# Patient Record
Sex: FEMALE | Race: WHITE | Employment: UNEMPLOYED | URBAN - METROPOLITAN AREA
[De-identification: names, ages, dates, MRNs, and addresses within clinical notes are randomized per-mention and may not be internally consistent; named-entity substitution may affect disease eponyms.]

---

## 2020-05-21 ENCOUNTER — PREP FOR PROCEDURE (OUTPATIENT)
Dept: PAIN MANAGEMENT | Age: 50
End: 2020-05-21

## 2020-05-21 ENCOUNTER — OFFICE VISIT (OUTPATIENT)
Dept: PAIN MANAGEMENT | Age: 50
End: 2020-05-21
Payer: COMMERCIAL

## 2020-05-21 VITALS
SYSTOLIC BLOOD PRESSURE: 136 MMHG | HEART RATE: 84 BPM | DIASTOLIC BLOOD PRESSURE: 82 MMHG | TEMPERATURE: 98.2 F | RESPIRATION RATE: 16 BRPM

## 2020-05-21 PROBLEM — M79.604 PAIN IN RIGHT LEG: Status: ACTIVE | Noted: 2020-05-21

## 2020-05-21 PROBLEM — M47.816 LUMBAR FACET ARTHROPATHY: Status: ACTIVE | Noted: 2020-05-21

## 2020-05-21 PROBLEM — M54.16 LUMBAR RADICULOPATHY: Status: ACTIVE | Noted: 2020-05-21

## 2020-05-21 PROBLEM — G89.4 CHRONIC PAIN SYNDROME: Status: ACTIVE | Noted: 2020-05-21

## 2020-05-21 PROBLEM — M47.816 LUMBAR SPONDYLOSIS: Status: ACTIVE | Noted: 2020-05-21

## 2020-05-21 PROBLEM — M51.9 LUMBAR DISC DISORDER: Status: ACTIVE | Noted: 2020-05-21

## 2020-05-21 PROCEDURE — G8427 DOCREV CUR MEDS BY ELIG CLIN: HCPCS | Performed by: PAIN MEDICINE

## 2020-05-21 PROCEDURE — G8421 BMI NOT CALCULATED: HCPCS | Performed by: PAIN MEDICINE

## 2020-05-21 PROCEDURE — 99204 OFFICE O/P NEW MOD 45 MIN: CPT | Performed by: PAIN MEDICINE

## 2020-05-21 PROCEDURE — 99204 OFFICE O/P NEW MOD 45 MIN: CPT

## 2020-05-21 PROCEDURE — 1036F TOBACCO NON-USER: CPT | Performed by: PAIN MEDICINE

## 2020-05-21 RX ORDER — ACETAMINOPHEN 325 MG/1
650 TABLET ORAL EVERY 6 HOURS PRN
COMMUNITY

## 2020-05-21 NOTE — PATIENT INSTRUCTIONS
.    Pain Management Department      Epidural Spinal Steroid Injection:  Epidural steroid injections can be an effective treatment for nerve root pain. Pinched or irritated nerves that exit the spine can cause shooting pain, burning, tingling, numbness, and muscle weakness. This is often caused by a bulging disc, herniated disc (slipped disc), trauma or aging of the spine. Epidural injections can help by placing a steroid medication which is an anti- inflammatory medicine around the irritated nerves to reduce inflammation. What are the different types of epidural spinal injections;  Caudal epidural steroid injections, if symptoms are in the low back and lower extremities. Mostly done for patients who had low back surgery. Lumbar lnterlaminar epidural injections, if symptoms are in the lower back and lower extremities. Thoracic lnterlaminar epidural injections, if symptoms are in the mid back and rib cage. Cervical lnterlaminar epidural injections, if symptoms are in the neck and upper extremities. Pre-procedure Instructions:  1- Your current medications will be reviewed and you will be instructed on which medications to discontinue before the procedure. 2- lf sedation is administered you will be required to fast before the procedure (eight hours) 3- A  will be required if sedation is administered. Procedure:  A local anesthetic will be used to numb your skin. A needle will be advanced under X-ray to the target area. Placement will be confirmed by dye injection after which medicine will be placed, then the needle will be removed and Band-Aid will be applied. Post-procedure: You will be monitored in the recovery room for up to 30 minutes after the injection, when you are ready to leave, the staff will give you the discharge instructions. The extent and duration of pain relief may depend on the amount of disc or nerve root irritation. Other coexisting factors may contribute to your pain. Sometimes an injection brings several weeks to months of pain relief  and then further treatment is needed.     Pain Management Department      Epidural Spinal Steroid Injection:  Epidural steroid injections can be an effective treatment for nerve root pain. Pinched or irritated nerves that exit the spine can cause shooting pain, burning, tingling, numbness, and muscle weakness. This is often caused by a bulging disc, herniated disc (slipped disc), trauma or aging of the spine. Epidural injections can help by placing a steroid medication which is an anti- inflammatory medicine around the irritated nerves to reduce inflammation. What are the different types of epidural spinal injections;  Caudal epidural steroid injections, if symptoms are in the low back and lower extremities. Mostly done for patients who had low back surgery. Lumbar lnterlaminar epidural injections, if symptoms are in the lower back and lower extremities. Thoracic lnterlaminar epidural injections, if symptoms are in the mid back and rib cage. Cervical lnterlaminar epidural injections, if symptoms are in the neck and upper extremities. Pre-procedure Instructions:  3- Your current medications will be reviewed and you will be instructed on which medications to discontinue before the procedure. 4- lf sedation is administered you will be required to fast before the procedure (eight hours) 3- A  will be required if sedation is administered. Procedure:  A local anesthetic will be used to numb your skin. A needle will be advanced under X-ray to the target area. Placement will be confirmed by dye injection after which medicine will be placed, then the needle will be removed and Band-Aid will be applied. Post-procedure: You will be monitored in the recovery room for up to 30 minutes after the injection, when you are ready to leave, the staff will give you the discharge instructions.  The extent and duration of pain relief may

## 2020-05-21 NOTE — PROGRESS NOTES
Do you currently have any of the following:    Fever: No  Headache:  No  Cough: No  Shortness of breath: No  Exposed to anyone with these symptoms: No                                                                                                                Karyna Witt presents to the [address] [address] on 5/21/2020. Olimpia Valencia is complaining of pain in her mid and lower back and down the right leg. The pain is constant. The pain is described as sharp and burning. Pain is rated on her best day at a 5, on her worst day at a 10, and on average at a 5 on the VAS scale. She took her last dose of Tylenol 2 weeks ago. Olimpia Valencia does not have issues with constipation. She is not on NSAIDS and  is not on anticoagulation medications. Pacemaker or defibrilator: No Physician managing device is N/A.       /82   Pulse 84   Temp 98.2 °F (36.8 °C)   Resp 16      No LMP recorded. Patient has had a hysterectomy.
and interventional procedures. Patient had tried physical therapy with moderate relief. Will schedule patient for right L2 and L4 transforaminal epidural steroid injection and assess her response. R/B/A discussed in details. Will consider low dose Neurontin if her radicular symptoms doesn't improve. Will consider EMG of right lower extremity if her symptoms are not improving. Continue with OTC pain medications. Cancel urine screen, no opioids started  OARRS report reviewed 05/2020. Patient encouraged to stay active and to lose weight. Treatment plan discussed with the patient including medications and procedure side effects. We discussed with the patient that combining opioids, benzodiazepines, alcohol, illicit drugs or sleep aids increases the risk of respiratory depression including death. We discussed that these medications may cause drowsiness, sedation or dizziness and have counseled the patient not to drive or operate machinery. We have discussed that these medications will be prescribed only by one provider. We have discussed with the patient about age related risk factors and have thoroughly discussed the importance of taking these medications as prescribed. The patient verbalizes understanding. ccrefquaning arpita Aguilar M.D.

## 2020-06-18 ENCOUNTER — TELEPHONE (OUTPATIENT)
Dept: PAIN MANAGEMENT | Age: 50
End: 2020-06-18

## 2020-06-18 NOTE — TELEPHONE ENCOUNTER
6-18-20-Dari is scheduled for her procedure on 6-29-20. Spoke to her, she states she did Physical therapy from November 2019-February 2020. She now does her home exercises that she learned from [address] [place] [address]. She has increasing pain in her back and is having a hard time walking, walking up the stairs and has a hard time doing household chores. She rates her pain level between 7-9. Will do insurance authorization with [facility] to see if the procedure will be authorized.     Fabiola Canchola RN  Pain Management

## 2020-06-22 ENCOUNTER — HOSPITAL ENCOUNTER (OUTPATIENT)
Age: 50
Discharge: HOME | End: 2020-06-24
Payer: COMMERCIAL

## 2020-06-22 PROCEDURE — U0003 INFECTIOUS AGENT DETECTION BY NUCLEIC ACID (DNA OR RNA); SEVERE ACUTE RESPIRATORY SYNDROME CORONAVIRUS 2 (SARS-COV-2) (CORONAVIRUS DISEASE [COVID-19]), AMPLIFIED PROBE TECHNIQUE, MAKING USE OF HIGH THROUGHPUT TECHNOLOGIES AS DESCRIBED BY CMS-2020-01-R: HCPCS

## 2020-06-24 LAB
SARS-COV-2: NOT DETECTED
SOURCE: NORMAL

## 2020-06-29 ENCOUNTER — HOSPITAL ENCOUNTER (OUTPATIENT)
Age: 50
Setting detail: OUTPATIENT SURGERY
Discharge: HOME | End: 2020-06-29
Attending: PAIN MEDICINE | Admitting: PAIN MEDICINE
Payer: COMMERCIAL

## 2020-06-29 ENCOUNTER — HOSPITAL ENCOUNTER (OUTPATIENT)
Dept: OPERATING ROOM | Age: 50
Setting detail: OUTPATIENT SURGERY
Discharge: HOME | End: 2020-06-29
Attending: PAIN MEDICINE
Payer: COMMERCIAL

## 2020-06-29 VITALS
RESPIRATION RATE: 12 BRPM | SYSTOLIC BLOOD PRESSURE: 135 MMHG | OXYGEN SATURATION: 98 % | DIASTOLIC BLOOD PRESSURE: 70 MMHG | TEMPERATURE: 98.2 F | HEART RATE: 74 BPM

## 2020-06-29 PROCEDURE — 7100000010 HC PHASE II RECOVERY - FIRST 15 MIN: Performed by: PAIN MEDICINE

## 2020-06-29 PROCEDURE — 6360000002 HC RX W HCPCS: Performed by: PAIN MEDICINE

## 2020-06-29 PROCEDURE — 3209999900 FLUORO FOR SURGICAL PROCEDURES

## 2020-06-29 PROCEDURE — 3600000005 HC SURGERY LEVEL 5 BASE: Performed by: PAIN MEDICINE

## 2020-06-29 PROCEDURE — 3600000015 HC SURGERY LEVEL 5 ADDTL 15MIN: Performed by: PAIN MEDICINE

## 2020-06-29 PROCEDURE — 2500000003 HC RX 250 WO HCPCS: Performed by: PAIN MEDICINE

## 2020-06-29 PROCEDURE — 64483 NJX AA&/STRD TFRM EPI L/S 1: CPT | Performed by: PAIN MEDICINE

## 2020-06-29 PROCEDURE — 7100000011 HC PHASE II RECOVERY - ADDTL 15 MIN: Performed by: PAIN MEDICINE

## 2020-06-29 PROCEDURE — 6360000004 HC RX CONTRAST MEDICATION: Performed by: PAIN MEDICINE

## 2020-06-29 PROCEDURE — 64484 NJX AA&/STRD TFRM EPI L/S EA: CPT | Performed by: PAIN MEDICINE

## 2020-06-29 PROCEDURE — 2709999900 HC NON-CHARGEABLE SUPPLY: Performed by: PAIN MEDICINE

## 2020-06-29 RX ORDER — LIDOCAINE HYDROCHLORIDE 5 MG/ML
INJECTION, SOLUTION INFILTRATION; INTRAVENOUS PRN
Status: DISCONTINUED | OUTPATIENT
Start: 2020-06-29 | End: 2020-06-29 | Stop reason: ALTCHOICE

## 2020-06-29 ASSESSMENT — PAIN SCALES - GENERAL
PAINLEVEL_OUTOF10: 3
PAINLEVEL_OUTOF10: 0

## 2020-06-29 ASSESSMENT — PAIN - FUNCTIONAL ASSESSMENT
PAIN_FUNCTIONAL_ASSESSMENT: PREVENTS OR INTERFERES SOME ACTIVE ACTIVITIES AND ADLS
PAIN_FUNCTIONAL_ASSESSMENT: 0-10

## 2020-06-29 ASSESSMENT — PAIN DESCRIPTION - DESCRIPTORS: DESCRIPTORS: ACHING;BURNING

## 2020-06-29 NOTE — OP NOTE
2020    Patient: Felton Skiff  :  1970  Age:  53 y.o. Sex:  female     PRE-OPERATIVE DIAGNOSIS: Lumbar disc displacement, lumbar neural foraminal stenosis, lumbar radiculopathy. POST-OPERATIVE DIAGNOSIS: Same. PROCEDURE: Right Transforaminal epidural steroid injection under fluoroscopic guidance at foraminal level L2 and L4 (#1). SURGEON: MINDY Ford M.D. ANESTHESIA: Local    ESTIMATED BLOOD LOSS: None.  ______________________________________________________________________  BRIEF HISTORY: Felton Skiff comes in today for the first Right transforaminal epidural steroid injection under fluoroscopic guidance at foraminal level L2 and L4 . The potential complications of this procedure were discussed with her again today. She has elected to undergo the aforementioned procedure. Stella complete History & Physical examination were reviewed in depth, a copy of which is in the chart. DESCRIPTION OF PROCEDURE:    After confirming written and informed consent, a time-out was performed and Stelal name and date of birth, the procedure to be performed as well as the plan for the location of the needle insertion were confirmed. The patient was brought into the procedure room and placed in the prone position on the fluoroscopy table. Standard monitors were placed and vital signs were observed throughout the procedure. The area of the lumbar spine was prepped with chloraprep and draped in a sterile manner. The vertebral body was identified with AP fluoroscopy. An oblique view was obtained to better visualize the inferior junction of the pedicle and transverse process . The 6 o'clock position of the pedicle was marked and identified. The skin and subcutaneous tissue were anesthetized with 0.5% lidocaine. A # 22 gauge pencil point needle was directed toward the targeted point under fluoroscopy until bone was contacted.  The needle was then walked inferiorly until the neural foramen was entered . A lateral fluoroscopic view was then used to place the needle tip in the middle of the foramen. Negative aspiration was confirmed for blood and CSF and 0.5 cc of Omnipaque 240 contrast was injected at each level under live fluoroscopy. Appropriate neurograms were observed under AP fluoroscopy. Then after negative aspiration, a solution of the 2 cc of 0.5% lidocaine and 40 mg DepoMedrol was easily injected at each level. The needles were removed with constant aspiration technique. The patient back was cleaned and a bandage was placed over the needle insertion points    Disposition the patient tolerated the procedure well and there were no complications . Vital signs remained stable throughout the procedure. The patient was escorted to the recovery area where they remained until discharge and written discharge instructions for the procedure were given. Plan: Hussein Lloyd will return to our pain management center as scheduled.      Lalit Correia MD

## 2020-06-29 NOTE — H&P
Stress: Not on file   Relationships    Social connections     Talks on phone: Not on file     Gets together: Not on file     Attends Qu[employer] service: Not on file     Active member of club or organization: Not on file     Attends meetings of clubs or organizations: Not on file     Relationship status: Not on file    Intimate partner violence     Fear of current or ex partner: Not on file     Emotionally abused: Not on file     Physically abused: Not on file     Forced sexual activity: Not on file   Other Topics Concern    Not on file   Social History Narrative    Not on file       Family History   Problem Relation Age of Onset    Heart Failure Mother     Cancer Brother     Arthritis Brother     Coronary Art Dis Brother     Diabetes Sister     Hypertension Sister     Arthritis Sister          REVIEW OF SYSTEMS:    CONSTITUTIONAL:  negative for  fevers, chills, sweats and fatigue    RESPIRATORY:  negative for  dry cough, cough with sputum, dyspnea, wheezing and chest pain    CARDIOVASCULAR:  negative for chest pain, dyspnea, palpitations, syncope    GASTROINTESTINAL:  negative for nausea, vomiting, change in bowel habits, diarrhea, constipation and abdominal pain    MUSCULOSKELETAL: negative for muscle weakness    SKIN: negative for itching or rashes. BEHAVIOR/PSYCH:  negative for poor appetite, increased appetite, decreased sleep and poor concentration    All other systems negative      PHYSICAL EXAM:    VITALS:  /64   Pulse 80   Temp 98.2 °F (36.8 °C) (Temporal)   Resp 20   SpO2 97%     CONSTITUTIONAL:  awake, alert, cooperative, no apparent distress, and appears stated age    EYES: PERRLA, EOMI    LUNGS:  No increased work of breathing, no audible wheezing    CARDIOVASCULAR:  regular rate and rhythm    ABDOMEN:  Soft non tender non distended     EXTREMITIES: no signs of clubbing or cyanosis. MUSCULOSKELETAL: negative for flaccid muscle tone or spastic movements.     SKIN: gross examination reveals no signs of rashes, or diaphoresis. NEURO: Cranial nerves II-XII grossly intact. No signs of agitated mood.        Assessment/Plan:    Low back and right  pain for right L2 an L4 TFESI

## 2020-08-04 ENCOUNTER — OFFICE VISIT (OUTPATIENT)
Dept: PAIN MANAGEMENT | Age: 50
End: 2020-08-04
Payer: COMMERCIAL

## 2020-08-04 VITALS
HEART RATE: 91 BPM | SYSTOLIC BLOOD PRESSURE: 122 MMHG | WEIGHT: 235 LBS | OXYGEN SATURATION: 98 % | DIASTOLIC BLOOD PRESSURE: 80 MMHG | HEIGHT: 66 IN | TEMPERATURE: 98 F | RESPIRATION RATE: 16 BRPM | BODY MASS INDEX: 37.77 KG/M2

## 2020-08-04 PROCEDURE — G8427 DOCREV CUR MEDS BY ELIG CLIN: HCPCS | Performed by: PAIN MEDICINE

## 2020-08-04 PROCEDURE — 1036F TOBACCO NON-USER: CPT | Performed by: PAIN MEDICINE

## 2020-08-04 PROCEDURE — G8417 CALC BMI ABV UP PARAM F/U: HCPCS | Performed by: PAIN MEDICINE

## 2020-08-04 PROCEDURE — 99213 OFFICE O/P EST LOW 20 MIN: CPT | Performed by: PAIN MEDICINE

## 2020-08-04 NOTE — PROGRESS NOTES
[address] [facility], [address] [place] [address] Felice  [phone]    Follow up Note      eNy Ray     Date of Visit:  8/4/2020    CC:  Patient presents for follow up   Chief Complaint   Patient presents with    Follow Up After Procedure      Right Transforaminal epidural steroid injection under fluoroscopic guidance at foraminal level L2 and L4 (#1). HPI:    Pain is unchanged. Appropriate analgesia with current medications regimen: not applicable. Change in quality of symptoms:no. Medication side effects:not applicable . Recent diagnostic testing:none. Recent interventional procedures:Right L2 and L4 TFESI .poor. She has not been on anticoagulation medications to include none. The patient  has not been on herbal supplements. The patient is not diabetic. Imaging:   Lumbar spine MRI 02/2020  Degenerative changes of the lumbar spine as discussed level by level in   the body of the report.  No significant change since prior examination.    Findings are most pronounced at L4-5 with mild to moderate left and mild   right neural foraminal stenosis, unchanged.  Right far lateral disc   extrusion at L2-3 abutting exiting nerve roots, unchanged.  No   significant spinal canal narrowing.     Previous treatments: medications. .         Potential Aberrant Drug-Related Behavior:   None     Urine Drug Screening:  None     OARRS report:  08/2020 consistent    Past Medical History:   Diagnosis Date    Arthritis     Cancer ([facility] [address]     liver per  2012, partial removal [facility]    Fibromyalgia     Fibromyalgia     Headache     migraines    Seizure ([facility] [address]     noneptilectic     Past Surgical History:   Procedure Laterality Date    ANESTHESIA NERVE BLOCK Right 6/29/2020    RIGHT L2 AND L4 TRANSFORAMINAL EPIDURAL STEROID INJECTION (CPT 20029,38157) performed by Franca Brannon MD at [facility] HYSTERECTOMY      LIVER RESECTION      NERVE BLOCK Right 06/29/2020    transforaminal L2 and L4     Prior to Admission medications    Medication Sig Start Date End Date Taking? Authorizing Provider   acetaminophen (TYLENOL) 325 MG tablet Take 650 mg by mouth every 6 hours as needed for Pain   Yes Historical Provider, MD     Allergies   Allergen Reactions    Aspirin Shortness Of Breath     States lip swelling, trouble breathing      Codeine Shortness Of Breath     States also passes out.     Synthroid [Levothyroxine Sodium] Swelling     Social History     Socioeconomic History    Marital status:      Spouse name: Not on file    Number of children: Not on file    Years of education: Not on file    Highest education level: Not on file   Occupational History    Not on file   Social Needs    Financial resource strain: Not on file    Food insecurity     Worry: Not on file     Inability: Not on file    Transportation needs     Medical: Not on file     Non-medical: Not on file   Tobacco Use    Smoking status: Never Smoker    Smokeless tobacco: Never Used   Substance and Sexual Activity    Alcohol use: No    Drug use: No    Sexual activity: Yes     Partners: Male   Lifestyle    Physical activity     Days per week: Not on file     Minutes per session: Not on file    Stress: Not on file   Relationships    Social connections     Talks on phone: Not on file     Gets together: Not on file     Attends Jehovah's witness service: Not on file     Active member of club or organization: Not on file     Attends meetings of clubs or organizations: Not on file     Relationship status: Not on file    Intimate partner violence     Fear of current or ex partner: Not on file     Emotionally abused: Not on file     Physically abused: Not on file     Forced sexual activity: Not on file   Other Topics Concern    Not on file   Social History Narrative    Not on file     Family History   Problem Relation Age of Onset    Heart Failure Mother     Cancer Brother     Arthritis Brother     Coronary Art Dis Brother     Diabetes Sister     Hypertension Sister     Arthritis Sister      REVIEW OF SYSTEMS:     [facility] denies fever/chills, chest pain, shortness of breath, new bowel or bladder complaints. All other review of systems was negative. PHYSICAL EXAMINATION:      /80   Pulse 91   Temp 98 °F (36.7 °C) (Infrared)   Resp 16   Ht 5' 6\" (1.676 m)   Wt 235 lb (106.6 kg)   SpO2 98%   BMI 37.93 kg/m²     General:       General appearance:   frail, elderly, pleasant and well-hydrated. , in moderate discomfort and A & O x3  Build: Morbidly obese     HEENT:     Head:normocephalic and atraumatic  Pupils:regular, round and equal.  Sclera: icterus present,      Lungs:     Breathing:Breathing Pattern: regular, no distress     Abdomen:     Shape:non-distended and normal  Tenderness:none     Lumbar spine:     Spine inspection:normal, exaggerated kyphosis, scoliosis, lordosis and surgical incision scar   CVA tenderness:No   Palpationright facet tenderness   Range of motion:abnormal moderately Lateral bending, flexion, extension rotation right and is  painful.     Musculoskeletal:     Trigger points in Paraveteral:absent bilaterally  SI joint tenderness:negative right, negative left. SLR:negative right, negative left, sitting      Extremities:     Tremors:None bilaterally upper and lower  Range of motion, pain with internal rotation of hips negative. Intact:Yes  Edema:Normal     Neurological:     Sensory:diminished to light touch lateral aspect of right leg.   Motor:                          Right Quadriceps5/5          Left Quadriceps5/5           Right Gastrocnemius5/5    Left Gastrocnemius5/5  Right Ant Tibialis5/5  Left Ant Tibialis5/5  Reflexes:    Right Quadriceps reflex2+  Left Quadriceps reflex2+  Right Achilles reflex2+  Left Achilles reflex2+  Gait:normal     Dermatology:     Skin:no unusual rashes and no skin lesions     Impression:  Chronic low back pain with radiation to the right lower extremity with numbness in the right leg. Lumbar spine MRI 2020 L2-3 right lateral disc extrusion and L4-5 disc bulge with facet hypertrophy and foraminal stenosis. Plan:  Follow up on her low back pain. Patient is s/p Right L2 and L4 TFESI with less than expected response. Will schedule patient for right lower extremity EMG. Continue with OTC pain medications. Cancel urine screen, no opioids started  OARRS report reviewed 05/2020. Patient encouraged to stay active and to lose weight. Failed physical therapy. Will refer patient for TENS unit. Treatment plan discussed with the patient including medications side effects. We discussed with the patient that combining opioids, benzodiazepines, alcohol, illicit drugs or sleep aids increases the risk of respiratory depression including death. We discussed that these medications may cause drowsiness, sedation or dizziness and have counseled the patient not to drive or operate machinery. We have discussed that these medications will be prescribed only by one provider. We have discussed with the patient about age related risk factors and have thoroughly discussed the importance of taking these medications as prescribed. The patient verbalizes understanding. ccreferring physic M. Arvell Hodgkin M.D.

## 2020-08-04 NOTE — PROGRESS NOTES
Do you currently have any of the following:    Fever: No  Headache:  No  Cough: No  Shortness of breath: No  Exposed to anyone with these symptoms: No                                                                                                                Marilyne Givens presents to the [address] on 8/4/2020. Nickie Traore is complaining of pain mid to lower back and right arm and leg. . The pain is constant. The pain is described as aching, throbbing, shooting, stabbing and sharp. Pain is rated on her best day at a 5, on her worst day at a 10, and on average at a 7 on the VAS scale. She took her last dose of Tylenol on Sunday. Lina Pisano does not have issues with constipation. Any procedures since your last visit: Yes, with 0 % relief. She is not on NSAIDS and  is not on anticoagulation medications to include none and is managed by NA. Pacemaker or defibrilator: No Physician managing device is NA.       /80   Pulse 91   Temp 98 °F (36.7 °C) (Infrared)   Resp 16   Ht 5' 6\" (1.676 m)   Wt 235 lb (106.6 kg)   SpO2 98%   BMI 37.93 kg/m²      No LMP recorded. Patient has had a hysterectomy.

## 2020-09-01 ENCOUNTER — OFFICE VISIT (OUTPATIENT)
Dept: PHYSICAL MEDICINE AND REHAB | Age: 50
End: 2020-09-01
Payer: COMMERCIAL

## 2020-09-01 VITALS
WEIGHT: 235 LBS | BODY MASS INDEX: 37.77 KG/M2 | HEIGHT: 66 IN | DIASTOLIC BLOOD PRESSURE: 80 MMHG | TEMPERATURE: 98 F | SYSTOLIC BLOOD PRESSURE: 132 MMHG

## 2020-09-01 PROCEDURE — 99201 PR OFFICE OUTPATIENT NEW 10 MINUTES: CPT | Performed by: PHYSICAL MEDICINE & REHABILITATION

## 2020-09-01 PROCEDURE — 95886 MUSC TEST DONE W/N TEST COMP: CPT | Performed by: PHYSICAL MEDICINE & REHABILITATION

## 2020-09-01 PROCEDURE — G8427 DOCREV CUR MEDS BY ELIG CLIN: HCPCS | Performed by: PHYSICAL MEDICINE & REHABILITATION

## 2020-09-01 PROCEDURE — G8417 CALC BMI ABV UP PARAM F/U: HCPCS | Performed by: PHYSICAL MEDICINE & REHABILITATION

## 2020-09-01 PROCEDURE — 95912 NRV CNDJ TEST 11-12 STUDIES: CPT | Performed by: PHYSICAL MEDICINE & REHABILITATION

## 2020-09-01 PROCEDURE — 1036F TOBACCO NON-USER: CPT | Performed by: PHYSICAL MEDICINE & REHABILITATION

## 2020-09-01 NOTE — PROGRESS NOTES
L Tibial - AH 48.0 4.9 43.1   L Peroneal - EDB 47.5 3.7 43.8         H Reflex      Nerve Lat Hmax    ms   R Tibial - Soleus 31.3   L Tibial - Soleus 32.7         EMG         EMG Summary Table     Spontaneous MUAP Recruitment   Muscle IA Fib PSW Fasc H.F. Amp Dur. PPP Pattern   R. Tibialis anterior Normal None None None None Normal Normal None Normal   R. Extensor hallucis longus Normal None None None None Normal Normal None Normal   R. Gastrocnemius (Medial head) Normal None None None None Normal Normal None Normal   R. Vastus medialis Normal None None None None Normal Normal None Normal   R. Vastus lateralis Normal None None None None Normal Normal None Normal   R. Adductor longus Normal None None None None Normal Normal None Normal   R. Lumbar paraspinals (mid) Normal None Few None None       R. Lumbar paraspinals (low) Normal None 1+ None None                  Summary of Findings:   Nerve conduction studies:   Sensory nerve conduction studies of the bilateral sural and superficial peroneal nerves showed normal distal latencies and normal SNAP amplitudes. Motor nerve conduction studies of the bilateral tibial and common peroneal nerves showed normal distal latencies, normal CMAP amplitudes, and normal conduction velocities. F-wave studies of the bilateral tibial and common peroneal nerves revealed normal latencies. Bilateral tibial nerve H-reflex responses were normal.     Needle EMG:   · Needle EMG was performed using a monopolar needle. · The following abnormalities were seen on needle EMG: Abnormal spontaneous activity was noted in the right lumbar paraspinals.  All other muscles tested, as listed in the table above, demonstrated normal amplitude, duration, phases, and recruitment, without evidence of active denervation. Diagnostic Interpretation:      This study was Abnormal.     1. There is electrodiagnostic evidence of right lumbar motor radiculopathy, as indicated by abnormal right lumbar paraspinal testing. This is not further defined, however, as all tested right lower extremity muscles are normal.     2. There is no electrodiagnostic evidence of any other peripheral nerve mononeuropathy, plexopathy, or peripheral polyneuropathy in the bilateral lower extremities. Cannot evaluate for left lumbar radiculopathy without completion of needle exam of the left lower extremity. Cannot evaluate for pure sensory radiculopathy or small fiber neuropathy by electrodiagnostic technique. Previous Study: No prior study available. Follow up EMG can be completed in the future if clinically indicated. Technologist: Kristen Moscoso    Physician:  Malik Moseley MD  Physical Medicine and Rehabilitation    Nerve conduction studies and electromyography were performed according to our laboratory policies and procedures which can be provided upon request. All abnormal values are identified in the table.  Laboratory normal values can also be provided upon request.       Cc: MD Thais Chow DO
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
## 2020-09-16 ENCOUNTER — OFFICE VISIT (OUTPATIENT)
Dept: PAIN MANAGEMENT | Age: 50
End: 2020-09-16
Payer: COMMERCIAL

## 2020-09-16 VITALS
TEMPERATURE: 97.1 F | HEART RATE: 76 BPM | HEIGHT: 66 IN | WEIGHT: 234 LBS | SYSTOLIC BLOOD PRESSURE: 142 MMHG | BODY MASS INDEX: 37.61 KG/M2 | RESPIRATION RATE: 16 BRPM | DIASTOLIC BLOOD PRESSURE: 80 MMHG

## 2020-09-16 PROBLEM — M54.6 PAIN IN THORACIC SPINE: Status: ACTIVE | Noted: 2020-09-16

## 2020-09-16 PROCEDURE — 1036F TOBACCO NON-USER: CPT | Performed by: PAIN MEDICINE

## 2020-09-16 PROCEDURE — 99214 OFFICE O/P EST MOD 30 MIN: CPT | Performed by: PAIN MEDICINE

## 2020-09-16 PROCEDURE — G8427 DOCREV CUR MEDS BY ELIG CLIN: HCPCS | Performed by: PAIN MEDICINE

## 2020-09-16 PROCEDURE — 99213 OFFICE O/P EST LOW 20 MIN: CPT | Performed by: PAIN MEDICINE

## 2020-09-16 PROCEDURE — G8417 CALC BMI ABV UP PARAM F/U: HCPCS | Performed by: PAIN MEDICINE

## 2020-09-16 RX ORDER — GABAPENTIN 100 MG/1
200 CAPSULE ORAL 2 TIMES DAILY
Qty: 120 CAPSULE | Refills: 0 | Status: SHIPPED
Start: 2020-09-16 | End: 2020-10-20

## 2020-09-16 NOTE — PROGRESS NOTES
Do you currently have any of the following:    Fever: No  Headache:  No  Cough: No  Shortness of breath: No  Exposed to anyone with these symptoms: No                                                                                                                Nnamdimarcos Thompson presents to the [facility] on 9/16/2020. Virgie Damico is complaining of pain in her low back. The pain is constant. The pain is described as colicky, stabbing and burning. Pain is rated on her best day at a 4, on her worst day at a 10, and on average at a 7 on the VAS scale. She took her last dose of Tylenol yesterday. Nona Sifuentes does not have issues with constipation. Any procedures since your last visit: No    She is not on NSAIDS and  is not on anticoagulation medications to include none. Pacemaker or defibrilator: No.       BP (!) 142/80   Pulse 76   Temp 97.1 °F (36.2 °C) (Temporal)   Resp 16   Ht 5' 6\" (1.676 m)   Wt 234 lb (106.1 kg)   BMI 37.77 kg/m²      No LMP recorded. Patient has had a hysterectomy.

## 2020-09-16 NOTE — PROGRESS NOTES
report:  09/2020 consistent    Past Medical History:   Diagnosis Date    Arthritis     Cancer [facility])     liver per  2012, partial removal [facility]    Fibromyalgia     Fibromyalgia     Headache     migraines    Low back pain     Seizure ([facility] [address]     noneptilectic     Past Surgical History:   Procedure Laterality Date    ANESTHESIA NERVE BLOCK Right 6/29/2020    RIGHT L2 AND L4 TRANSFORAMINAL EPIDURAL STEROID INJECTION (CPT 72823,33178) performed by Saumya Mcwilliams MD at [address] [facility] LIVER RESECTION      NERVE BLOCK Right 06/29/2020    transforaminal L2 and L4     Prior to Admission medications    Medication Sig Start Date End Date Taking? Authorizing Provider   acetaminophen (TYLENOL) 325 MG tablet Take 650 mg by mouth every 6 hours as needed for Pain   Yes Historical Provider, MD     Allergies   Allergen Reactions    Aspirin Shortness Of Breath     States lip swelling, trouble breathing      Codeine Shortness Of Breath     States also passes out.     Synthroid [Levothyroxine Sodium] Swelling     Social History     Socioeconomic History    Marital status:      Spouse name: Not on file    Number of children: Not on file    Years of education: Not on file    Highest education level: Not on file   Occupational History    Not on file   Social Needs    Financial resource strain: Not on file    Food insecurity     Worry: Not on file     Inability: Not on file    Transportation needs     Medical: Not on file     Non-medical: Not on file   Tobacco Use    Smoking status: Never Smoker    Smokeless tobacco: Never Used   Substance and Sexual Activity    Alcohol use: No    Drug use: No    Sexual activity: Yes     Partners: Male   Lifestyle    Physical activity     Days per week: Not on file     Minutes per session: Not on file    Stress: Not on file   Relationships    Social connections     Talks on phone: Not on file     Gets together: Not on file     Attends Orthodoxy service: Not on file     Active member of club or organization: Not on file     Attends meetings of clubs or organizations: Not on file     Relationship status: Not on file    Intimate partner violence     Fear of current or ex partner: Not on file     Emotionally abused: Not on file     Physically abused: Not on file     Forced sexual activity: Not on file   Other Topics Concern    Not on file   Social History Narrative    Not on file     Family History   Problem Relation Age of Onset    Heart Failure Mother     Cancer Brother     Arthritis Brother     Coronary Art Dis Brother     Diabetes Sister     Hypertension Sister     Arthritis Sister      REVIEW OF SYSTEMS:     Kirby Pizano denies fever/chills, chest pain, shortness of breath, new bowel or bladder complaints. All other review of systems was negative. PHYSICAL EXAMINATION:      BP (!) 142/80   Pulse 76   Temp 97.1 °F (36.2 °C) (Temporal)   Resp 16   Ht 5' 6\" (1.676 m)   Wt 234 lb (106.1 kg)   BMI 37.77 kg/m²     General:       General appearance:   frail, elderly, pleasant and well-hydrated. , in moderate discomfort and A & O x3  Build: Morbidly obese     HEENT:     Head:normocephalic and atraumatic  Sclera: icterus present,      Lungs:     Breathing:Breathing Pattern: regular, no distress     Abdomen:     Shape:non-distended and normal  Tenderness:none    Thoracic spine:    Spine inspection:normal   pationPal:tenderness paravertebral muscles, midline tenderness, facet loading, left, right and negative.   Range of motion:abnormal moderately flexion, extension rotation bilateral and is  painful.     Lumbar spine:     Spine inspection:normal, exaggerated kyphosis, scoliosis, lordosis and surgical incision scar   CVA tenderness:No   Palpationright facet tenderness   Range of motion:abnormal moderately Lateral bending, flexion, extension rotation right and is painful.     Musculoskeletal:     Trigger points in Paraveteral:absent bilaterally  SI joint tenderness:negative right, negative left. SLR:negative right, negative left, sitting      Extremities:     Tremors:None bilaterally upper and lower  Range of motion, pain with internal rotation of hips negative. Intact:Yes  Edema:Normal     Neurological:     Sensory:diminished to light touch lateral aspect of right leg. Motor:                          Right Quadriceps5/5          Left Quadriceps5/5           Right Gastrocnemius5/5    Left Gastrocnemius5/5  Right Ant Tibialis5/5  Left Ant Tibialis5/5  Reflexes:    Right Quadriceps reflex2+  Left Quadriceps reflex2+  Right Achilles reflex2+  Left Achilles reflex2+  Gait:normal     Dermatology:     Skin:no unusual rashes and no skin lesions     Impression:  Chronic low back pain with radiation to the right lower extremity with numbness in the right leg. Lumbar spine MRI 2020 L2-3 right lateral disc extrusion and L4-5 disc bulge with facet hypertrophy and foraminal stenosis. Plan:  Follow up on her low back pain with complaints of increased mid back pain. Results of right lower extremity EMG were discussed with the patient. Will schedule patient for thoracic spine Xray. Will start patient on Gabapentin 200 mg to be titrated to 400 mg as tolerated. Continue with OTC pain medications. OARRS report reviewed 09/2020. Patient encouraged to stay active and to lose weight. Failed physical therapy. Awaiting TENS unit. Treatment plan discussed with the patient including medications side effects. We discussed with the patient that combining opioids, benzodiazepines, alcohol, illicit drugs or sleep aids increases the risk of respiratory depression including death. We discussed that these medications may cause drowsiness, sedation or dizziness and have counseled the patient not to drive or operate machinery.  We have discussed that these medications will be prescribed only by one provider. We have discussed with the patient about age related risk factors and have thoroughly discussed the importance of taking these medications as prescribed. The patient verbalizes understanding. ccreferring physic M. Arvell Hodgkin M.D.

## 2020-10-20 ENCOUNTER — OFFICE VISIT (OUTPATIENT)
Dept: PAIN MANAGEMENT | Age: 50
End: 2020-10-20
Payer: COMMERCIAL

## 2020-10-20 VITALS
TEMPERATURE: 96.9 F | RESPIRATION RATE: 18 BRPM | DIASTOLIC BLOOD PRESSURE: 84 MMHG | SYSTOLIC BLOOD PRESSURE: 144 MMHG | HEART RATE: 84 BPM | OXYGEN SATURATION: 98 % | WEIGHT: 235 LBS | BODY MASS INDEX: 37.77 KG/M2 | HEIGHT: 66 IN

## 2020-10-20 PROCEDURE — 99213 OFFICE O/P EST LOW 20 MIN: CPT | Performed by: NURSE PRACTITIONER

## 2020-10-20 RX ORDER — TIZANIDINE 2 MG/1
2 TABLET ORAL 2 TIMES DAILY
Qty: 60 TABLET | Refills: 0 | Status: SHIPPED
Start: 2020-10-20 | End: 2020-11-17 | Stop reason: SINTOL

## 2020-10-20 RX ORDER — GABAPENTIN 400 MG/1
400 CAPSULE ORAL 2 TIMES DAILY
Qty: 60 CAPSULE | Refills: 0
Start: 2020-10-20 | End: 2020-10-21 | Stop reason: SDUPTHER

## 2020-10-20 NOTE — PROGRESS NOTES
left lower extremity. Cannot evaluate for pure sensory radiculopathy or small fiber neuropathy by electrodiagnostic technique.      Previous treatments: medications. Faith Villarreal                                        Potential Aberrant Drug-Related Behavior:   None      Urine Drug Screening:  None      OARRS report:  10/2020 consistent    Past Medical History:   Diagnosis Date    Arthritis     Cancer ([facility] [address]     liver per  2012, partial removal [facility]    Fibromyalgia     Fibromyalgia     Headache     migraines    Low back pain     Seizure ([facility] [address]     noneptilectic       Past Surgical History:   Procedure Laterality Date    ANESTHESIA NERVE BLOCK Right 6/29/2020    RIGHT L2 AND L4 TRANSFORAMINAL EPIDURAL STEROID INJECTION (CPT 59853,46810) performed by Sherron Thornton MD at [address] [facility] LIVER RESECTION      NERVE BLOCK Right 06/29/2020    transforaminal L2 and L4       Prior to Admission medications    Medication Sig Start Date End Date Taking? Authorizing Provider   gabapentin (NEURONTIN) 100 MG capsule Take 2 capsules by mouth 2 times daily for 30 days. Take two capsules QHS for ine week then two capsules BID as tolerated 9/16/20 10/16/20  Sherron Thornton MD   acetaminophen (TYLENOL) 325 MG tablet Take 650 mg by mouth every 6 hours as needed for Pain    Historical Provider, MD       Allergies   Allergen Reactions    Aspirin Shortness Of Breath     States lip swelling, trouble breathing      Codeine Shortness Of Breath     States also passes out.     Synthroid [Levothyroxine Sodium] Swelling       Social History     Socioeconomic History    Marital status:      Spouse name: Not on file    Number of children: Not on file    Years of education: Not on file    Highest education level: Not on file   Occupational History    Not on file   Social Needs    Financial resource strain: Not on file    Food insecurity     Worry: Not on file     Inability: Not on file    Transportation needs     Medical: Not on file     Non-medical: Not on file   Tobacco Use    Smoking status: Never Smoker    Smokeless tobacco: Never Used   Substance and Sexual Activity    Alcohol use: No    Drug use: No    Sexual activity: Yes     Partners: Male   Lifestyle    Physical activity     Days per week: Not on file     Minutes per session: Not on file    Stress: Not on file   Relationships    Social connections     Talks on phone: Not on file     Gets together: Not on file     Attends Advent service: Not on file     Active member of club or organization: Not on file     Attends meetings of clubs or organizations: Not on file     Relationship status: Not on file    Intimate partner violence     Fear of current or ex partner: Not on file     Emotionally abused: Not on file     Physically abused: Not on file     Forced sexual activity: Not on file   Other Topics Concern    Not on file   Social History Narrative    Not on file       Family History   Problem Relation Age of Onset    Heart Failure Mother     Cancer Brother     Arthritis Brother     Coronary Art Dis Brother     Diabetes Sister     Hypertension Sister     Arthritis Sister        REVIEW OF SYSTEMS:     [place] denies fever/chills, chest pain, shortness of breath, new bowel or bladder complaints or suicidal ideations. All other review of systems wasnegative. Review of Systems    PHYSICAL EXAMINATION:        General:       General appearance: tearful, pleasant and well-hydrated. , in moderate to severe discomfort and A & O x3  Build: Morbidly obese     HEENT:     Head:normocephalic and atraumatic  Sclera: icterus present,      Lungs:     Breathing:Breathing Pattern: regular, no distress     Abdomen:     Shape:non-distended and normal  Tenderness:none     Thoracic spine:     Spine inspection:normal   pationPal:tenderness paravertebral muscles, midline tenderness, facet loading, left, physical therapy. Awaiting TENS unit. Treatment plan discussed with the patient including medications side effects.     We discussed with the patient that combining opioids, benzodiazepines, alcohol, illicit drugs or sleep aids increases the risk of respiratory depression including death. We discussed that these medications may cause drowsiness, sedation or dizziness and have counseled the patient not to drive or operate machinery. We have discussed that these medications will be prescribed only by one provider. We have discussed with the patient about age related risk factors and have thoroughly discussed the importance of taking these medications as prescribed. The patient verbalizes understanding.         ccreferring physic          Electronically signed by SCAR Medina CNP on 10/20/20 at 8:30 AM EDT

## 2020-10-20 NOTE — PROGRESS NOTES
Do you currently have any of the following:    Fever: No  Headache:  No  Cough: No  Shortness of breath: No  Exposed to anyone with these symptoms: No                                                                                                                Stephanie Valentino presents to the [facility] on 10/20/2020. Nohemi Perez is complaining of pain under there bra and down the right leg . The pain is constant. The pain is described as aching, throbbing, shooting and stabbing. Pain is rated on her best day at a 6, on her worst day at a 7, and on average at a 6 on the VAS scale. She took her last dose of Neurontin today . Nohemi Perez does not have issues with constipation. Any procedures since your last visit: No,       She is not on NSAIDS and  is not on anticoagulation medication  Pacemaker or defibrilator: No Physician managing device is none. BP (!) 144/84   Pulse 84   Temp 96.9 °F (36.1 °C)   Resp 18   Ht 5' 6\" (1.676 m)   Wt 235 lb (106.6 kg)   SpO2 98%   BMI 37.93 kg/m²      No LMP recorded. Patient has had a hysterectomy.

## 2020-10-21 ENCOUNTER — TELEPHONE (OUTPATIENT)
Dept: PAIN MANAGEMENT | Age: 50
End: 2020-10-21

## 2020-10-21 RX ORDER — GABAPENTIN 400 MG/1
400 CAPSULE ORAL 2 TIMES DAILY
Qty: 60 CAPSULE | Refills: 1 | Status: SHIPPED
Start: 2020-10-21 | End: 2020-12-15 | Stop reason: SDUPTHER

## 2020-11-03 PROBLEM — M47.816 LUMBAR SPONDYLOSIS: Status: RESOLVED | Noted: 2020-05-21 | Resolved: 2020-11-03

## 2020-11-16 NOTE — PROGRESS NOTES
radiculopathy without completion of needle exam of the left lower extremity. Cannot evaluate for pure sensory radiculopathy or small fiber neuropathy by electrodiagnostic technique.      Previous treatments: medications. [place]uni                                        Potential Aberrant Drug-Related Behavior:   None      Urine Drug Screening:  None      OARRS report:  11/2020 consistent    Past Medical History:   Diagnosis Date    Arthritis     Cancer ([facility] [address]     liver per  2012, partial removal [facility]    Fibromyalgia     Fibromyalgia     Headache     migraines    Low back pain     Seizure ([facility] [address]     noneptilectic       Past Surgical History:   Procedure Laterality Date    ANESTHESIA NERVE BLOCK Right 6/29/2020    RIGHT L2 AND L4 TRANSFORAMINAL EPIDURAL STEROID INJECTION (CPT 97522,95359) performed by Julio César Andre MD at [address] [facility] LIVER RESECTION      NERVE BLOCK Right 06/29/2020    transforaminal L2 and L4       Prior to Admission medications    Medication Sig Start Date End Date Taking? Authorizing Provider   gabapentin (NEURONTIN) 400 MG capsule Take 1 capsule by mouth 2 times daily for 30 days. 10/21/20 11/20/20  Arpita Patel APRN - CNP   tiZANidine (ZANAFLEX) 2 MG tablet Take 1 tablet by mouth 2 times daily 10/20/20 11/19/20  Pecjanelle Patel, APRN - CNP   acetaminophen (TYLENOL) 325 MG tablet Take 650 mg by mouth every 6 hours as needed for Pain    Historical Provider, MD       Allergies   Allergen Reactions    Aspirin Shortness Of Breath     States lip swelling, trouble breathing      Codeine Shortness Of Breath     States also passes out.     Synthroid [Levothyroxine Sodium] Swelling       Social History     Socioeconomic History    Marital status:      Spouse name: Not on file    Number of children: Not on file    Years of education: Not on file    Highest education level: Not on file   Occupational History    Not on file   Social Needs    Financial resource strain: Not on file    Food insecurity     Worry: Not on file     Inability: Not on file    Transportation needs     Medical: Not on file     Non-medical: Not on file   Tobacco Use    Smoking status: Never Smoker    Smokeless tobacco: Never Used   Substance and Sexual Activity    Alcohol use: No    Drug use: No    Sexual activity: Yes     Partners: Male   Lifestyle    Physical activity     Days per week: Not on file     Minutes per session: Not on file    Stress: Not on file   Relationships    Social connections     Talks on phone: Not on file     Gets together: Not on file     Attends Buddhist service: Not on file     Active member of club or organization: Not on file     Attends meetings of clubs or organizations: Not on file     Relationship status: Not on file    Intimate partner violence     Fear of current or ex partner: Not on file     Emotionally abused: Not on file     Physically abused: Not on file     Forced sexual activity: Not on file   Other Topics Concern    Not on file   Social History Narrative    Not on file       Family History   Problem Relation Age of Onset    Heart Failure Mother     Cancer Brother     Arthritis Brother     Coronary Art Dis Brother     Diabetes Sister     Hypertension Sister     Arthritis Sister        REVIEW OF SYSTEMS:     Bong Armendariz denies fever/chills, chest pain, shortness of breath, new bowel or bladder complaints or suicidal ideations. All other review of systems wasnegative. Review of Systems    PHYSICAL EXAMINATION:        General:       General appearance: tearful, pleasant and well-hydrated. , in moderate to severe discomfort and A & O x3  Build: Morbidly obese     HEENT:     Head:normocephalic and atraumatic  Sclera: icterus present,      Lungs:     Breathing:Breathing Pattern: regular, no distress     Abdomen:     Shape:non-distended and normal  Tenderness:none     Thoracic spine:     Spine inspection:normal   pationPal:tenderness paravertebral muscles, midline tenderness, facet loading, left, right and negative. Range of motion:abnormal moderately flexion, extension rotation bilateral and is mildly painful.     Lumbar spine:     Spine inspection:normal, exaggerated kyphosis, scoliosis, lordosis and surgical incision scar   CVA tenderness:No   Palpationright facet tenderness   Range of motion:abnormal moderately Lateral bending, flexion, extension rotation right and is moderately painful.     Musculoskeletal:     Trigger points in Paraveteral:present right side  SI joint tenderness:negative right, negative left. SLR:positive  right, negative left, sitting      Extremities:     Tremors:None bilaterally upper and lower  Range of motion, pain with internal rotation of hips negative. Intact:Yes  Edema: +2 pitting edema to bilateral LE      Neurological:     Sensory:diminished to light touch lateral aspect of right leg. Motor:                          Right Quadriceps3/5          Left Quadriceps5/5           Right Gastrocnemius3/5    Left Gastrocnemius5/5  Right Ant Tibialis3/5  Left Ant Tibialis5/5    Gait:normal     Dermatology:     Skin:no unusual rashes and no skin lesions     Impression:  Chronic low back pain with radiation to the right lower extremity with numbness in the right leg. Lumbar spine MRI 2020 L2-3 right lateral disc extrusion and L4-5 disc bulge with facet hypertrophy and foraminal stenosis. Plan:  Follow up on low back pain with right l lateral leg radicular sx with weakness and numbness and tingling to foot, main complaint  Continue gabapentin 400mg po bid, 1 refill on file   Trial Robaxin 500mg 1/2 to 1 tablet po bid x 15 days   DC Tizanidine    NSAIDS contraindicated due to allergy to ASA  Continue with OTC pain medications as tolerated  Discussed repeat LESI but change to L4-L5, patient not interested  OARRS report reviewed 11/2020.   Patient encouraged to stay active and to lose weight. Failed physical therapy. Treatment plan discussed with the patient including medications side effects.     We discussed with the patient that combining opioids, benzodiazepines, alcohol, illicit drugs or sleep aids increases the risk of respiratory depression including death. We discussed that these medications may cause drowsiness, sedation or dizziness and have counseled the patient not to drive or operate machinery. We have discussed that these medications will be prescribed only by one provider. We have discussed with the patient about age related risk factors and have thoroughly discussed the importance of taking these medications as prescribed. The patient verbalizes understanding.         ccreferring physic          Electronically signed by SCAR Erickson CNP on 11/17/20 at 8:30 AM EDT

## 2020-11-17 ENCOUNTER — OFFICE VISIT (OUTPATIENT)
Dept: PAIN MANAGEMENT | Age: 50
End: 2020-11-17
Payer: COMMERCIAL

## 2020-11-17 VITALS
OXYGEN SATURATION: 93 % | WEIGHT: 230 LBS | RESPIRATION RATE: 18 BRPM | DIASTOLIC BLOOD PRESSURE: 82 MMHG | SYSTOLIC BLOOD PRESSURE: 128 MMHG | HEART RATE: 70 BPM | TEMPERATURE: 97.1 F | HEIGHT: 66 IN | BODY MASS INDEX: 36.96 KG/M2

## 2020-11-17 PROCEDURE — 99213 OFFICE O/P EST LOW 20 MIN: CPT | Performed by: NURSE PRACTITIONER

## 2020-11-17 RX ORDER — METHOCARBAMOL 500 MG/1
500 TABLET, FILM COATED ORAL 2 TIMES DAILY PRN
Qty: 30 TABLET | Refills: 0 | Status: SHIPPED | OUTPATIENT
Start: 2020-11-17 | End: 2020-12-02

## 2020-12-15 ENCOUNTER — OFFICE VISIT (OUTPATIENT)
Dept: PAIN MANAGEMENT | Age: 50
End: 2020-12-15
Payer: COMMERCIAL

## 2020-12-15 VITALS
RESPIRATION RATE: 16 BRPM | WEIGHT: 230 LBS | HEIGHT: 66 IN | DIASTOLIC BLOOD PRESSURE: 68 MMHG | BODY MASS INDEX: 36.96 KG/M2 | HEART RATE: 84 BPM | TEMPERATURE: 97.3 F | SYSTOLIC BLOOD PRESSURE: 122 MMHG | OXYGEN SATURATION: 97 %

## 2020-12-15 PROCEDURE — 99213 OFFICE O/P EST LOW 20 MIN: CPT | Performed by: NURSE PRACTITIONER

## 2020-12-15 PROCEDURE — 1036F TOBACCO NON-USER: CPT | Performed by: NURSE PRACTITIONER

## 2020-12-15 PROCEDURE — G8484 FLU IMMUNIZE NO ADMIN: HCPCS | Performed by: NURSE PRACTITIONER

## 2020-12-15 PROCEDURE — G8427 DOCREV CUR MEDS BY ELIG CLIN: HCPCS | Performed by: NURSE PRACTITIONER

## 2020-12-15 PROCEDURE — G8417 CALC BMI ABV UP PARAM F/U: HCPCS | Performed by: NURSE PRACTITIONER

## 2020-12-15 RX ORDER — DULOXETIN HYDROCHLORIDE 30 MG/1
30 CAPSULE, DELAYED RELEASE ORAL DAILY
Qty: 30 CAPSULE | Refills: 0 | Status: SHIPPED
Start: 2020-12-15 | End: 2021-01-11 | Stop reason: SDUPTHER

## 2020-12-15 RX ORDER — METHOCARBAMOL 750 MG/1
750 TABLET, FILM COATED ORAL 2 TIMES DAILY
Qty: 20 TABLET | Refills: 0 | Status: SHIPPED
Start: 2020-12-15 | End: 2021-01-11 | Stop reason: SDUPTHER

## 2020-12-15 RX ORDER — GABAPENTIN 400 MG/1
400 CAPSULE ORAL 2 TIMES DAILY
Qty: 60 CAPSULE | Refills: 1 | Status: SHIPPED
Start: 2020-12-15 | End: 2021-01-11 | Stop reason: SDUPTHER

## 2020-12-15 NOTE — PROGRESS NOTES
Spouse name: Not on file    Number of children: Not on file    Years of education: Not on file    Highest education level: Not on file   Occupational History    Not on file   Social Needs    Financial resource strain: Not on file    Food insecurity     Worry: Not on file     Inability: Not on file    Transportation needs     Medical: Not on file     Non-medical: Not on file   Tobacco Use    Smoking status: Never Smoker    Smokeless tobacco: Never Used   Substance and Sexual Activity    Alcohol use: No    Drug use: No    Sexual activity: Yes     Partners: Male   Lifestyle    Physical activity     Days per week: Not on file     Minutes per session: Not on file    Stress: Not on file   Relationships    Social connections     Talks on phone: Not on file     Gets together: Not on file     Attends Tenriism service: Not on file     Active member of club or organization: Not on file     Attends meetings of clubs or organizations: Not on file     Relationship status: Not on file    Intimate partner violence     Fear of current or ex partner: Not on file     Emotionally abused: Not on file     Physically abused: Not on file     Forced sexual activity: Not on file   Other Topics Concern    Not on file   Social History Narrative    Not on file       Family History   Problem Relation Age of Onset    Heart Failure Mother     Cancer Brother     Arthritis Brother     Coronary Art Dis Brother     Diabetes Sister     Hypertension Sister     Arthritis Sister        REVIEW OF SYSTEMS:     Beata Pereira denies fever/chills, chest pain, shortness of breath, new bowel or bladder complaints or suicidal ideations. All other review of systems wasnegative. Review of Systems    PHYSICAL EXAMINATION:        General:       General appearance: tearful, pleasant and well-hydrated. , in moderate to severe discomfort and A & O x3  Build: Morbidly obese     HEENT:     Head:normocephalic and atraumatic Sclera: icterus present,      Lungs:     Breathing:Breathing Pattern: regular, no distress     Abdomen:     Shape:non-distended and normal  Tenderness:none     Thoracic spine:     Spine inspection:normal   pationPal:tenderness paravertebral muscles, midline tenderness, facet loading, left, right and negative. Range of motion:abnormal moderately flexion, extension rotation bilateral and is mildly painful.     Lumbar spine:     Spine inspection:normal, exaggerated kyphosis, scoliosis, lordosis and surgical incision scar   CVA tenderness:No   Palpationright facet tenderness   Range of motion:abnormal moderately Lateral bending, flexion, extension rotation right and is moderately painful. Facet loading positive right and left     Musculoskeletal:     Trigger points in Paraveteral:present right side  SI joint tenderness:negative right, negative left. SLR: negative right, negative left, sitting      Extremities:     Tremors:None bilaterally upper and lower  Range of motion, pain with internal rotation of hips negative. Intact:Yes  Edema: +2 pitting edema to bilateral LE      Neurological:     Sensory:diminished to light touch lateral aspect of right leg. Motor:                          Right Quadriceps3/5          Left Quadriceps5/5           Right Gastrocnemius3/5    Left Gastrocnemius5/5  Right Ant Tibialis3/5  Left Ant Tibialis5/5    Gait:normal     Dermatology:     Skin:no unusual rashes and no skin lesions     Impression:  Chronic low back pain with radiation to the right lower extremity with numbness in the right leg. Lumbar spine MRI 2020 L2-3 right lateral disc extrusion and L4-5 disc bulge with facet hypertrophy and foraminal stenosis.     Plan:  Follow up on low back pain with intermittent right lateral leg radicular sx with weakness and numbness and tingling to foot,  AXIAL low back pain main complaint, >70% low back and 30% is radicular pain Schedule for Bilateral Lumbar facet medial branch block L345 with sedation x1  Continue gabapentin 400mg po bid, 1 refill on file   Trial Duloxetine 30mg po daily  NSAIDS contraindicated due to allergy to ASA  Continue with OTC pain medications as tolerated  Not interested in repeating LESI  Discussed LMBB for severe low back pain  O[facility]RS report reviewed 12/2020. Patient encouraged to stay active and to lose weight. Failed physical therapy. Treatment plan discussed with the patient including medications side effects.     We discussed with the patient that combining opioids, benzodiazepines, alcohol, illicit drugs or sleep aids increases the risk of respiratory depression including death. We discussed that these medications may cause drowsiness, sedation or dizziness and have counseled the patient not to drive or operate machinery. We have discussed that these medications will be prescribed only by one provider. We have discussed with the patient about age related risk factors and have thoroughly discussed the importance of taking these medications as prescribed. The patient verbalizes understanding.         ccreferring physic      Electronically signed by Leopold Gemma, APRN - CNP on 12/15/20 at 8:30 AM EDT

## 2020-12-28 ENCOUNTER — HOSPITAL ENCOUNTER (OUTPATIENT)
Age: 50
Discharge: HOME | End: 2020-12-28
Payer: COMMERCIAL

## 2020-12-28 PROCEDURE — U0003 INFECTIOUS AGENT DETECTION BY NUCLEIC ACID (DNA OR RNA); SEVERE ACUTE RESPIRATORY SYNDROME CORONAVIRUS 2 (SARS-COV-2) (CORONAVIRUS DISEASE [COVID-19]), AMPLIFIED PROBE TECHNIQUE, MAKING USE OF HIGH THROUGHPUT TECHNOLOGIES AS DESCRIBED BY CMS-2020-01-R: HCPCS

## 2020-12-29 LAB
SARS-COV-2: NOT DETECTED
SOURCE: NORMAL

## 2020-12-30 ENCOUNTER — ANESTHESIA EVENT (OUTPATIENT)
Dept: OPERATING ROOM | Age: 50
End: 2020-12-30
Payer: COMMERCIAL

## 2020-12-30 NOTE — ANESTHESIA PRE PROCEDURE
Department of Anesthesiology  Preprocedure Note       Name:  Karyna Vázquez   Age:  53 y.o.  :  1970                                          MRN:  [id number]         Date:  2020      Surgeon: Yaakov Taylor):  Harpreet Gomez MD    Procedure: Procedure(s):  BILATERAL LUMBAR FACET MEDIAL BRANCH BLOCK L3,4,5 WITH IV SEDATION (CPT 85358)    Medications prior to admission:   Prior to Admission medications    Medication Sig Start Date End Date Taking? Authorizing Provider   ALBUTEROL IN Inhale into the lungs as needed   Yes Historical Provider, MD   DULoxetine (CYMBALTA) 30 MG extended release capsule Take 1 capsule by mouth daily 12/15/20 1/14/21 Yes SCAR Burroughs CNP   gabapentin (NEURONTIN) 400 MG capsule Take 1 capsule by mouth 2 times daily for 30 days. 12/15/20 1/14/21 Yes SCAR Burroughs CNP   acetaminophen (TYLENOL) 325 MG tablet Take 650 mg by mouth every 6 hours as needed for Pain   Yes Historical Provider, MD       Current medications:    No current facility-administered medications for this encounter. Current Outpatient Medications   Medication Sig Dispense Refill    ALBUTEROL IN Inhale into the lungs as needed      DULoxetine (CYMBALTA) 30 MG extended release capsule Take 1 capsule by mouth daily 30 capsule 0    gabapentin (NEURONTIN) 400 MG capsule Take 1 capsule by mouth 2 times daily for 30 days. 60 capsule 1    acetaminophen (TYLENOL) 325 MG tablet Take 650 mg by mouth every 6 hours as needed for Pain         Allergies: Allergies   Allergen Reactions    Aspirin Shortness Of Breath     States lip swelling, trouble breathing      Codeine Shortness Of Breath     States also passes out.     Synthroid [Levothyroxine Sodium] Swelling       Problem List:    Patient Active Problem List   Diagnosis Code    Chronic pain syndrome G89.4    Lumbar disc disorder M51.9    Lumbar facet arthropathy M47.816    Lumbar radiculopathy M54.16    Pain in right leg M79.604 GLUCOSE 126 10/17/2016    CALCIUM 10.0 10/17/2016       POC Tests: No results for input(s): POCGLU, POCNA, POCK, POCCL, POCBUN, POCHEMO, POCHCT in the last 72 hours. Coags: No results found for: PROTIME, INR, APTT    HCG (If Applicable): No results found for: PREGTESTUR, PREGSERUM, HCG, HCGQUANT     ABGs: No results found for: PHART, PO2ART, PLB7KEV, YKT5YOE, BEART, S6LPQTCA     Type & Screen (If Applicable):  No results found for: LABABO, LABRH    Drug/Infectious Status (If Applicable):  No results found for: HIV, HEPCAB    COVID-19 Screening (If Applicable):   Lab Results   Component Value Date    COVID19 Not Detected 12/28/2020         Anesthesia Evaluation  Patient summary reviewed no history of anesthetic complications:   Airway: Mallampati: II  TM distance: >3 FB   Neck ROM: full  Mouth opening: > = 3 FB Dental: normal exam         Pulmonary: breath sounds clear to auscultation  (+) asthma:                            Cardiovascular:Negative CV ROS            Rhythm: regular                      Neuro/Psych:   (+) seizures (Last seizure 2017): well controlled, neuromuscular disease (Chronic pain syndrome, lumbar disc disorder, pain in thoracic spine):, headaches: migraine headaches,              ROS comment: Fibromyalgia GI/Hepatic/Renal: Neg GI/Hepatic/Renal ROS            Endo/Other:    (+) malignancy/cancer (s/p hysterectomy, s/p liver resection). Abdominal:   (+) obese,         Vascular: negative vascular ROS. Anesthesia Plan      MAC     ASA 2       Induction: intravenous. MIPS: Prophylactic antiemetics administered. Preoperative evaluation note updated on 12/30/2020 based on review of patient's medical records on same date. Patient to be evaluated in person by anesthesiologist on day of surgery.      Diane Holman MD   12/30/2020    DOS STAFF ADDENDUM: Pt seen and examined, chart reviewed (including anesthesia, drug and allergy history). Anesthetic plan, risks, benefits, alternatives, and personnel involved discussed with patient. Patient verbalized an understanding and agrees to proceed. Plan discussed with care team members and agreed upon.     Chapin Morgan MD  Staff Anesthesiologist  10:06 AM

## 2021-01-04 ENCOUNTER — ANESTHESIA (OUTPATIENT)
Dept: OPERATING ROOM | Age: 51
End: 2021-01-04
Payer: COMMERCIAL

## 2021-01-04 ENCOUNTER — HOSPITAL ENCOUNTER (OUTPATIENT)
Age: 51
Setting detail: OUTPATIENT SURGERY
Discharge: HOME | End: 2021-01-04
Attending: PAIN MEDICINE | Admitting: PAIN MEDICINE
Payer: COMMERCIAL

## 2021-01-04 ENCOUNTER — HOSPITAL ENCOUNTER (OUTPATIENT)
Dept: OPERATING ROOM | Age: 51
Setting detail: OUTPATIENT SURGERY
Discharge: HOME | End: 2021-01-04
Attending: PAIN MEDICINE
Payer: COMMERCIAL

## 2021-01-04 VITALS
BODY MASS INDEX: 37.77 KG/M2 | RESPIRATION RATE: 14 BRPM | HEART RATE: 72 BPM | DIASTOLIC BLOOD PRESSURE: 61 MMHG | TEMPERATURE: 97 F | OXYGEN SATURATION: 97 % | SYSTOLIC BLOOD PRESSURE: 134 MMHG | WEIGHT: 235 LBS | HEIGHT: 66 IN

## 2021-01-04 VITALS
RESPIRATION RATE: 16 BRPM | OXYGEN SATURATION: 100 % | SYSTOLIC BLOOD PRESSURE: 140 MMHG | DIASTOLIC BLOOD PRESSURE: 78 MMHG

## 2021-01-04 DIAGNOSIS — M47.896 OTHER OSTEOARTHRITIS OF SPINE, LUMBAR REGION: ICD-10-CM

## 2021-01-04 DIAGNOSIS — M43.06 LUMBAR SPONDYLOLYSIS: Primary | ICD-10-CM

## 2021-01-04 PROCEDURE — 6360000002 HC RX W HCPCS: Performed by: NURSE ANESTHETIST, CERTIFIED REGISTERED

## 2021-01-04 PROCEDURE — 3209999900 FLUORO FOR SURGICAL PROCEDURES

## 2021-01-04 PROCEDURE — 3700000000 HC ANESTHESIA ATTENDED CARE: Performed by: PAIN MEDICINE

## 2021-01-04 PROCEDURE — 7100000010 HC PHASE II RECOVERY - FIRST 15 MIN: Performed by: PAIN MEDICINE

## 2021-01-04 PROCEDURE — 3600000015 HC SURGERY LEVEL 5 ADDTL 15MIN: Performed by: PAIN MEDICINE

## 2021-01-04 PROCEDURE — 2709999900 HC NON-CHARGEABLE SUPPLY: Performed by: PAIN MEDICINE

## 2021-01-04 PROCEDURE — 7100000011 HC PHASE II RECOVERY - ADDTL 15 MIN: Performed by: PAIN MEDICINE

## 2021-01-04 PROCEDURE — 2580000003 HC RX 258: Performed by: ANESTHESIOLOGY

## 2021-01-04 PROCEDURE — 2500000003 HC RX 250 WO HCPCS: Performed by: PAIN MEDICINE

## 2021-01-04 PROCEDURE — 3600000005 HC SURGERY LEVEL 5 BASE: Performed by: PAIN MEDICINE

## 2021-01-04 PROCEDURE — 3700000001 HC ADD 15 MINUTES (ANESTHESIA): Performed by: PAIN MEDICINE

## 2021-01-04 PROCEDURE — 6360000002 HC RX W HCPCS: Performed by: PAIN MEDICINE

## 2021-01-04 PROCEDURE — 64493 INJ PARAVERT F JNT L/S 1 LEV: CPT | Performed by: PAIN MEDICINE

## 2021-01-04 PROCEDURE — 64494 INJ PARAVERT F JNT L/S 2 LEV: CPT | Performed by: PAIN MEDICINE

## 2021-01-04 RX ORDER — SODIUM CHLORIDE, SODIUM LACTATE, POTASSIUM CHLORIDE, CALCIUM CHLORIDE 600; 310; 30; 20 MG/100ML; MG/100ML; MG/100ML; MG/100ML
INJECTION, SOLUTION INTRAVENOUS CONTINUOUS
Status: DISCONTINUED | OUTPATIENT
Start: 2021-01-04 | End: 2021-01-04 | Stop reason: HOSPADM

## 2021-01-04 RX ORDER — LIDOCAINE HYDROCHLORIDE 5 MG/ML
INJECTION, SOLUTION INFILTRATION; INTRAVENOUS PRN
Status: DISCONTINUED | OUTPATIENT
Start: 2021-01-04 | End: 2021-01-04 | Stop reason: ALTCHOICE

## 2021-01-04 RX ORDER — MIDAZOLAM HYDROCHLORIDE 1 MG/ML
INJECTION INTRAMUSCULAR; INTRAVENOUS PRN
Status: DISCONTINUED | OUTPATIENT
Start: 2021-01-04 | End: 2021-01-04 | Stop reason: SDUPTHER

## 2021-01-04 RX ORDER — FENTANYL CITRATE 50 UG/ML
INJECTION, SOLUTION INTRAMUSCULAR; INTRAVENOUS PRN
Status: DISCONTINUED | OUTPATIENT
Start: 2021-01-04 | End: 2021-01-04 | Stop reason: SDUPTHER

## 2021-01-04 RX ADMIN — MIDAZOLAM 2 MG: 1 INJECTION INTRAMUSCULAR; INTRAVENOUS at 10:59

## 2021-01-04 RX ADMIN — SODIUM CHLORIDE, POTASSIUM CHLORIDE, SODIUM LACTATE AND CALCIUM CHLORIDE: 600; 310; 30; 20 INJECTION, SOLUTION INTRAVENOUS at 09:34

## 2021-01-04 RX ADMIN — FENTANYL CITRATE 50 MCG: 50 INJECTION, SOLUTION INTRAMUSCULAR; INTRAVENOUS at 11:00

## 2021-01-04 ASSESSMENT — PAIN SCALES - GENERAL
PAINLEVEL_OUTOF10: 2
PAINLEVEL_OUTOF10: 2

## 2021-01-04 ASSESSMENT — PAIN DESCRIPTION - DESCRIPTORS: DESCRIPTORS: CONSTANT;BURNING;ACHING

## 2021-01-04 ASSESSMENT — PAIN DESCRIPTION - LOCATION
LOCATION: BACK
LOCATION: BACK

## 2021-01-04 ASSESSMENT — PAIN DESCRIPTION - PAIN TYPE
TYPE: SURGICAL PAIN;CHRONIC PAIN
TYPE: SURGICAL PAIN;CHRONIC PAIN

## 2021-01-04 ASSESSMENT — PAIN DESCRIPTION - FREQUENCY: FREQUENCY: CONTINUOUS

## 2021-01-04 NOTE — H&P
[address]  [address] [facility], [address] [place] [address] Felice  [phone]    Procedure History & Physical      González Manuel     HPI:    Patient  is here for axial low back pain for bilateral L3,4 MB and L5 DR block  Labs/imaging studies reviewed   All question and concerns addressed including R/B/A associated with the procedure    Past Medical History:   Diagnosis Date    Arthritis     Asthma     Cancer ([facility] [address] dx 2010    partial removal [facility] ( no chemo or radiation  in remission)    Fibromyalgia     Fibromyalgia     Headache     migraines    Low back pain     Seizure ([facility] [address]     noneptilectic  stress related  none since 2017  on no meds       Past Surgical History:   Procedure Laterality Date    ANESTHESIA NERVE BLOCK Right 6/29/2020    RIGHT L2 AND L4 TRANSFORAMINAL EPIDURAL STEROID INJECTION (CPT 13783,61931) performed by Darrall Mcburney, MD at [facility] LIVER RESECTION      NERVE BLOCK Right 06/29/2020    transforaminal L2 and L4       Prior to Admission medications    Medication Sig Start Date End Date Taking? Authorizing Provider   ALBUTEROL IN Inhale into the lungs as needed   Yes Historical Provider, MD   DULoxetine (CYMBALTA) 30 MG extended release capsule Take 1 capsule by mouth daily 12/15/20 1/14/21 Yes Earle Mom, APRN - CNP   gabapentin (NEURONTIN) 400 MG capsule Take 1 capsule by mouth 2 times daily for 30 days. 12/15/20 1/14/21 Yes Earle Mom APRN - CNP   acetaminophen (TYLENOL) 325 MG tablet Take 650 mg by mouth every 6 hours as needed for Pain   Yes Historical Provider, MD       Allergies   Allergen Reactions    Aspirin Shortness Of Breath     States lip swelling, trouble breathing      Codeine Shortness Of Breath     States also passes out.     Synthroid [Levothyroxine Sodium] Swelling       Social History     Socioeconomic History    Marital status:  Spouse name: Not on file    Number of children: Not on file    Years of education: Not on file    Highest education level: Not on file   Occupational History    Not on file   Social Needs    Financial resource strain: Not on file    Food insecurity     Worry: Not on file     Inability: Not on file    Transportation needs     Medical: Not on file     Non-medical: Not on file   Tobacco Use    Smoking status: Never Smoker    Smokeless tobacco: Never Used   Substance and Sexual Activity    Alcohol use: No    Drug use: No    Sexual activity: Yes     Partners: Male   Lifestyle    Physical activity     Days per week: Not on file     Minutes per session: Not on file    Stress: Not on file   Relationships    Social connections     Talks on phone: Not on file     Gets together: Not on file     Attends Sabianism service: Not on file     Active member of club or organization: Not on file     Attends meetings of clubs or organizations: Not on file     Relationship status: Not on file    Intimate partner violence     Fear of current or ex partner: Not on file     Emotionally abused: Not on file     Physically abused: Not on file     Forced sexual activity: Not on file   Other Topics Concern    Not on file   Social History Narrative    Not on file       Family History   Problem Relation Age of Onset    Heart Failure Mother     Cancer Brother     Arthritis Brother     Coronary Art Dis Brother     Diabetes Sister     Hypertension Sister     Arthritis Sister          REVIEW OF SYSTEMS:    CONSTITUTIONAL:  negative for  fevers, chills, sweats and fatigue    RESPIRATORY:  negative for  dry cough, cough with sputum, dyspnea, wheezing and chest pain    CARDIOVASCULAR:  negative for chest pain, dyspnea, palpitations, syncope    GASTROINTESTINAL:  negative for nausea, vomiting, change in bowel habits, diarrhea, constipation and abdominal pain    MUSCULOSKELETAL: negative for muscle weakness    SKIN: negative for

## 2021-01-04 NOTE — OP NOTE
2021    Patient: Awa Harrington  :  1970  Age:  53 y.o. Sex:  female     PRE-OPERATIVE DIAGNOSIS: Bilateral Lumbar spondylosis, lumbar facet syndrome. POST-OPERATIVE DIAGNOSIS: Same. PROCEDURE:  # 1 Fluoroscopic guided lumbar medial branch blocks Bilateral at Levels: L3, L4, L5  SURGEON: MINDY Baker M.D. ANESTHESIA: MAC    ESTIMATED BLOOD LOSS: None.  ______________________________________________________________________  BRIEF HISTORY:  Awa Harrington comes in today for 1 fluoroscopic guided lumbar medial branch blocks  Bilateral  at Levels: L3, L4, L5  The potential complications of this procedure were discussed with her again today. She has elected to undergo the aforementioned procedure. Stella complete History & Physical examination were reviewed in depth, a copy of which is in the chart. DESCRIPTION OF PROCEDURE:   After confirming written and informed consent, a time-out was performed and Stella name and date of birth, the procedure to be performed as well as the plan for the location of the needle insertion were confirmed. The patient was brought into the procedure room and placed in the prone position on the fluoroscopy table. Standard monitors were placed and vital signs were observed throughout the procedure. The area of the lumbar spine was prepped with chloraprep and draped in a sterile manner. AP fluoroscopy was used to identify and david bartons point at the targeted levels. The skin and subcutaneous tissues in these identified areas were anesthetized with 0.5%Lidocaine. A 22 # gauge 5 inch spinal needle was advanced toward the junction of the superior articular process and the transverse process, along the course of the medial branch. Satisfactory needle placement was confirmed by AP and oblique projections.   At the sacral alar level, the sacral alar region was visualized and the needle tip was positioned on the sacral alar at the base of the superior articulating process where the medial branch traverses under fluoroscopic guidance. Once bone was contacted and negative aspiration was confirmed. A solution of 0.25% marcaine with 40 DepoMedrol 3 cc was then injected and distributed equally at each level. Following the procedure Denny Luis noted improvement of previous pain symptoms. Disposition the patient tolerated the procedure well and there were no complications . Vital signs remained stable throughout the procedure. The patient was escorted to the recovery area where they remained until discharge and written discharge instructions for the procedure were given. Plan: Denny Luis will return to our pain management center as scheduled.      Svetlana Olivares MD

## 2021-01-08 NOTE — PROGRESS NOTES
[address] [place] [address], [address] [place], [place] [id number]  [phone]    Follow up Note      Mauro Newer     Date of Visit:  1/11/2021    CC:  Patient presents for follow up low back pain with right leg radicular sx    HPI: . Patient here with Low back pain is main complaint with intermittent radicular sx down right leg accompanied by numbness and tingling. Pt reports severe headache and worsened numbness and tingling to right calf and foot since the injection. Pt notes previous to injection the combination of medication regimen really helped manage pain and facilitate function. Appropriate analgesia with current medications regimen: no  Change in quality of symptoms: no  Medication side effects: none  Recent diagnostic testing: none  Recent interventional procedures: 1/4/21:S/P Bilateral Lumbar facet medial branch block L345 with >70% relief x 2 days but headache and increased n/t post injection     She has not been on anticoagulation medications to include none. The patient  has not been on herbal supplements. The patient is not diabetic.     Imaging:     Thoracic Xray 09/16/2020  There are    multilevel degenerative changes     Impression    No acute abnormality of the thoracic spine           Lumbar spine MRI 02/2020  Degenerative changes of the lumbar spine as discussed level by level in   the body of the report.  No significant change since prior examination.    Findings are most pronounced at L4-5 with mild to moderate left and mild   right neural foraminal stenosis, unchanged.  Right far lateral disc   extrusion at L2-3 abutting exiting nerve roots, unchanged.  No   significant spinal canal narrowing.     Right lower extremity EMG:  This study was Abnormal.   1. There is electrodiagnostic evidence of right lumbar motor radiculopathy, as indicated by abnormal right lumbar paraspinal testing. This is not further defined, however, as all tested right lower extremity muscles are normal.      2. There is no electrodiagnostic evidence of any other peripheral nerve mononeuropathy, plexopathy, or peripheral polyneuropathy in the bilateral lower extremities. Cannot evaluate for left lumbar radiculopathy without completion of needle exam of the left lower extremity. Cannot evaluate for pure sensory radiculopathy or small fiber neuropathy by electrodiagnostic technique.      Previous treatments: medications. , ztlido itching, dizziness, lethargy with tizanidine                                        Potential Aberrant Drug-Related Behavior:   None      Urine Drug Screening:  None      OARRS report:  01/2021 consistent    Past Medical History:   Diagnosis Date    Arthritis     Asthma     Cancer ([facility] [address] dx 2010    partial removal [facility] ( no chemo or radiation  in remission)    Fibromyalgia     Fibromyalgia     Headache     migraines    Low back pain     Seizure ([facility] [address]     noneptilectic  stress related  none since 2017  on no meds       Past Surgical History:   Procedure Laterality Date    ANESTHESIA NERVE BLOCK Right 6/29/2020    RIGHT L2 AND L4 TRANSFORAMINAL EPIDURAL STEROID INJECTION (CPT 43118,81656) performed by Mel Prakash MD at [address] [place] [address] Right 06/29/2020    transforaminal L2 and L4    NERVE BLOCK Bilateral 01/04/2021    Bilateral Lumbar Facet Media Branch Block L3,4,5 with sedation    NERVE BLOCK Bilateral 1/4/2021    BILATERAL LUMBAR FACET MEDIAL BRANCH BLOCK L3,4,5 WITH IV SEDATION (CPT I9374835) performed by Mel Prakash MD at [address] [place] [address]       Prior to Admission medications Medication Sig Start Date End Date Taking? Authorizing Provider   ALBUTEROL IN Inhale into the lungs as needed    Historical Provider, MD   DULoxetine (CYMBALTA) 30 MG extended release capsule Take 1 capsule by mouth daily 12/15/20 1/14/21  SCAR Huff CNP   gabapentin (NEURONTIN) 400 MG capsule Take 1 capsule by mouth 2 times daily for 30 days. 12/15/20 1/14/21  SCAR Huff CNP   acetaminophen (TYLENOL) 325 MG tablet Take 650 mg by mouth every 6 hours as needed for Pain    Historical Provider, MD       Allergies   Allergen Reactions    Aspirin Shortness Of Breath     States lip swelling, trouble breathing      Codeine Shortness Of Breath     States also passes out.     Synthroid [Levothyroxine Sodium] Swelling       Social History     Socioeconomic History    Marital status:      Spouse name: Not on file    Number of children: Not on file    Years of education: Not on file    Highest education level: Not on file   Occupational History    Not on file   Social Needs    Financial resource strain: Not on file    Food insecurity     Worry: Not on file     Inability: Not on file    Transportation needs     Medical: Not on file     Non-medical: Not on file   Tobacco Use    Smoking status: Never Smoker    Smokeless tobacco: Never Used   Substance and Sexual Activity    Alcohol use: No    Drug use: No    Sexual activity: Yes     Partners: Male   Lifestyle    Physical activity     Days per week: Not on file     Minutes per session: Not on file    Stress: Not on file   Relationships    Social connections     Talks on phone: Not on file     Gets together: Not on file     Attends Scientologist service: Not on file     Active member of club or organization: Not on file     Attends meetings of clubs or organizations: Not on file     Relationship status: Not on file    Intimate partner violence     Fear of current or ex partner: Not on file     Emotionally abused: Not on file Physically abused: Not on file     Forced sexual activity: Not on file   Other Topics Concern    Not on file   Social History Narrative    Not on file       Family History   Problem Relation Age of Onset    Heart Failure Mother     Cancer Brother     Arthritis Brother     Coronary Art Dis Brother     Diabetes Sister     Hypertension Sister     Arthritis Sister        REVIEW OF SYSTEMS:     Thelma Yanes denies fever/chills, chest pain, shortness of breath, new bowel or bladder complaints or suicidal ideations. All other review of systems wasnegative. Review of Systems    PHYSICAL EXAMINATION:        General:       General appearance: tearful, pleasant and well-hydrated. , in moderate to severe discomfort and A & O x3  Build: Morbidly obese     HEENT:     Head:normocephalic and atraumatic  Sclera: icterus present,      Lungs:     Breathing:Breathing Pattern: regular, no distress     Abdomen:     Shape:non-distended and normal  Tenderness:none     Thoracic spine:     Spine inspection:normal   pationPal:tenderness paravertebral muscles, midline tenderness, facet loading, left, right and negative. Range of motion:abnormal moderately flexion, extension rotation bilateral and is mildly painful.     Lumbar spine:     Spine inspection:normal, exaggerated kyphosis, scoliosis, lordosis and surgical incision scar   CVA tenderness:No   Palpationright facet tenderness   Range of motion:abnormal moderately Lateral bending, flexion, extension rotation right and is moderately painful. Facet loading positive right and left     Musculoskeletal:     Trigger points in Paraveteral:present right side  SI joint tenderness:negative right, negative left. SLR: negative right, negative left, sitting      Extremities:     Tremors:None bilaterally upper and lower  Range of motion, pain with internal rotation of hips negative.   Intact:Yes  Edema: +2 pitting edema to bilateral LE      Neurological:    Sensory:diminished to light touch lateral aspect of right leg and right calf  Motor:                          Right Quadriceps3/5          Left Quadriceps5/5           Right Gastrocnemius3/5    Left Gastrocnemius5/5  Right Ant Tibialis3/5  Left Ant Tibialis5/5    Gait:normal     Dermatology:     Skin:no unusual rashes and no skin lesions     Impression:  Chronic low back pain with radiation to the right lower extremity with numbness in the right leg. Lumbar spine MRI 2020 L2-3 right lateral disc extrusion and L4-5 disc bulge with facet hypertrophy and foraminal stenosis. Plan:  Follow up on low back pain with intermittent right lateral leg radicular sx with weakness and numbness and tingling to right calf and foot,  1/4/21:S/P Bilateral Lumbar facet medial branch block L345 with >70% relief but side effects noted  Refuses to have anymore interventional procedures  Refill gabapentin 400mg po bid with 2 refills  Refull Duloxetine 30mg po daily with 2 refills  Refill Robaxin 750mg po bid with 2 refills  Depomedrol 40mg IM x1 right glute  NSAIDS contraindicated due to allergy to ASA  Continue with OTC pain medications as tolerated  OARRS report reviewed 01/2021. Patient encouraged to stay active and to lose weight. Failed physical therapy. Treatment plan discussed with the patient including medications side effects.     We discussed with the patient that combining opioids, benzodiazepines, alcohol, illicit drugs or sleep aids increases the risk of respiratory depression including death. We discussed that these medications may cause drowsiness, sedation or dizziness and have counseled the patient not to drive or operate machinery. We have discussed that these medications will be prescribed only by one provider. We have discussed with the patient about age related risk factors and have thoroughly discussed the importance of taking these medications as prescribed. The patient verbalizes understanding. ccreferring physic      Electronically signed by SCAR Mccarty CNP on 01/11/20 at 8:30 AM JESIKAT

## 2021-01-11 ENCOUNTER — OFFICE VISIT (OUTPATIENT)
Dept: PAIN MANAGEMENT | Age: 51
End: 2021-01-11
Payer: COMMERCIAL

## 2021-01-11 VITALS
HEIGHT: 66 IN | DIASTOLIC BLOOD PRESSURE: 80 MMHG | WEIGHT: 235 LBS | SYSTOLIC BLOOD PRESSURE: 120 MMHG | TEMPERATURE: 97.1 F | RESPIRATION RATE: 16 BRPM | HEART RATE: 78 BPM | BODY MASS INDEX: 37.77 KG/M2 | OXYGEN SATURATION: 99 %

## 2021-01-11 DIAGNOSIS — M54.16 LUMBAR RADICULOPATHY: Primary | ICD-10-CM

## 2021-01-11 DIAGNOSIS — G89.4 CHRONIC PAIN SYNDROME: ICD-10-CM

## 2021-01-11 DIAGNOSIS — M43.06 LUMBAR SPONDYLOLYSIS: ICD-10-CM

## 2021-01-11 DIAGNOSIS — M51.9 LUMBAR DISC DISORDER: ICD-10-CM

## 2021-01-11 DIAGNOSIS — M54.6 PAIN IN THORACIC SPINE: ICD-10-CM

## 2021-01-11 DIAGNOSIS — M47.816 LUMBAR FACET ARTHROPATHY: ICD-10-CM

## 2021-01-11 PROCEDURE — 99213 OFFICE O/P EST LOW 20 MIN: CPT | Performed by: NURSE PRACTITIONER

## 2021-01-11 PROCEDURE — G8427 DOCREV CUR MEDS BY ELIG CLIN: HCPCS | Performed by: NURSE PRACTITIONER

## 2021-01-11 PROCEDURE — G8484 FLU IMMUNIZE NO ADMIN: HCPCS | Performed by: NURSE PRACTITIONER

## 2021-01-11 PROCEDURE — 1036F TOBACCO NON-USER: CPT | Performed by: NURSE PRACTITIONER

## 2021-01-11 PROCEDURE — 6360000002 HC RX W HCPCS

## 2021-01-11 PROCEDURE — G8417 CALC BMI ABV UP PARAM F/U: HCPCS | Performed by: NURSE PRACTITIONER

## 2021-01-11 RX ORDER — METHOCARBAMOL 750 MG/1
750 TABLET, FILM COATED ORAL 2 TIMES DAILY
Qty: 60 TABLET | Refills: 2 | Status: SHIPPED | OUTPATIENT
Start: 2021-01-11 | End: 2021-02-10

## 2021-01-11 RX ORDER — GABAPENTIN 400 MG/1
400 CAPSULE ORAL 2 TIMES DAILY
Qty: 60 CAPSULE | Refills: 2 | Status: SHIPPED
Start: 2021-01-11 | End: 2021-03-15 | Stop reason: ALTCHOICE

## 2021-01-11 RX ORDER — METHOCARBAMOL 750 MG/1
750 TABLET, FILM COATED ORAL 2 TIMES DAILY
COMMUNITY
End: 2021-04-09 | Stop reason: SDUPTHER

## 2021-01-11 RX ORDER — TRIAMCINOLONE ACETONIDE 40 MG/ML
40 INJECTION, SUSPENSION INTRA-ARTICULAR; INTRAMUSCULAR ONCE
Status: COMPLETED | OUTPATIENT
Start: 2021-01-11 | End: 2021-01-11

## 2021-01-11 RX ORDER — DULOXETIN HYDROCHLORIDE 30 MG/1
30 CAPSULE, DELAYED RELEASE ORAL DAILY
Qty: 30 CAPSULE | Refills: 2 | Status: SHIPPED
Start: 2021-01-11 | End: 2021-03-15 | Stop reason: ALTCHOICE

## 2021-01-11 RX ADMIN — TRIAMCINOLONE ACETONIDE 40 MG: 40 INJECTION, SUSPENSION INTRA-ARTICULAR; INTRAMUSCULAR at 10:32

## 2021-01-11 NOTE — PROGRESS NOTES
Do you currently have any of the following:    Fever: No  Headache:  No  Cough: No  Shortness of breath: No  Exposed to anyone with these symptoms: No                                                                                                                  Jacinta Burgess presents to the [address] on 1/11/2021. Merly Garcia is complaining of pain from head down to buttocks. . The pain is constant. The pain is described as stabbing and sharp. Pain is rated on her best day at a 7, on her worst day at a 10, and on average at a 8 on the VAS scale. She took her last dose of Neurontin and cymbaslta, robaxin, tylenol . Merly Garcia does not have issues with constipation. Any procedures since your last visit: Yes, with 50 % relief for 48 hours then the pain came back. She is not on NSAIDS and  is not on anticoagulation medications to include none and is managed by NA. Pacemaker or defibrillator: No Physician managing device is NA. Medication Contract and Consent for Opioid Use Documents Filed     Patient Documents       Type of Document Status Date Received Received By Description     Medication Contract Received 10/20/2020  9:17 AM FRANCY WILEY pain management pt agreement                   /80   Pulse 78   Temp 97.1 °F (36.2 °C) (Infrared)   Resp 16   Ht 5' 6\" (1.676 m)   Wt 235 lb (106.6 kg)   SpO2 99%   BMI 37.93 kg/m²      No LMP recorded. Patient has had a hysterectomy.

## 2021-02-01 ENCOUNTER — PREP FOR PROCEDURE (OUTPATIENT)
Dept: PAIN MANAGEMENT | Age: 51
End: 2021-02-01

## 2021-02-01 ENCOUNTER — VIRTUAL VISIT (OUTPATIENT)
Dept: PAIN MANAGEMENT | Age: 51
End: 2021-02-01
Payer: COMMERCIAL

## 2021-02-01 DIAGNOSIS — M54.6 PAIN IN THORACIC SPINE: ICD-10-CM

## 2021-02-01 DIAGNOSIS — M47.816 LUMBAR FACET ARTHROPATHY: ICD-10-CM

## 2021-02-01 DIAGNOSIS — G89.4 CHRONIC PAIN SYNDROME: ICD-10-CM

## 2021-02-01 DIAGNOSIS — M51.9 LUMBAR DISC DISORDER: ICD-10-CM

## 2021-02-01 DIAGNOSIS — M54.16 LUMBAR RADICULOPATHY: Primary | ICD-10-CM

## 2021-02-01 DIAGNOSIS — M47.816 LUMBAR SPONDYLOSIS: ICD-10-CM

## 2021-02-01 PROCEDURE — 99441 PR PHYS/QHP TELEPHONE EVALUATION 5-10 MIN: CPT | Performed by: NURSE PRACTITIONER

## 2021-02-01 NOTE — PROGRESS NOTES
[address] [place] [address], [address] [place] [address] Felice  [phone]  Telephone follow up visit      Date of Visit:  2/1/2021    CC:     Consent:  Telephone follow up due to Rashaun 19 pandemic   The patient and/or health care decision maker is aware that he/she may receive a bill for this telephone service, depending on his insurance coverage, and has provided verbal consent to proceed: Yes    I have considered the risks of abuse, dependence, addiction and diversion. My patient is aware that they will need a follow-up visit (in-person or virtually) at the appropriate time indicated for continued medications. Further, my patient is aware that when this acute crisis has lifted, they will be expected to return for an in-person visit and all elements of standard local and hospital guidelines in order to continue this medication. Patient location: Home   Physician Location:Other address in [place]    HPI:  Patient here with low back pain with worsening pain radiating to right hip down lateral leg to foot. Pain is now radiating down left leg intermittently but right side is worse and more debilitating. Numbness and tingling resolved with steroid injection last month s/p LMBB and doing better now. Appropriate analgesia with current medications regimen: no  Change in quality of symptoms: no  Medication side effects: none  Recent diagnostic testing: none  Recent interventional procedures: none      She has not been on anticoagulation medications to include none.  The patient Refugio Coon not been on herbal supplements.  The patient is not diabetic.     Imaging:      Thoracic Xray 09/16/2020  There are    multilevel degenerative changes      Impression    No acute abnormality of the thoracic spine            Lumbar spine MRI 02/2020 Degenerative changes of the lumbar spine as discussed level by level in the body of the report.  No significant change since prior examination.  Findings are most pronounced at L4-5 with mild to moderate left and mild right neural foraminal stenosis, unchanged.  Right far lateral disc extrusion at L2-3 abutting exiting nerve roots, unchanged.  No significant spinal canal narrowing. Right lower extremity EMG:  This study was Abnormal.    1. There is electrodiagnostic evidence of right lumbar motor radiculopathy, as indicated by abnormal right lumbar paraspinal testing. This is not further defined, however, as all tested right lower extremity muscles are normal.      2. There is no electrodiagnostic evidence of any other peripheral nerve mononeuropathy, plexopathy, or peripheral polyneuropathy in the bilateral lower extremities. Cannot evaluate for left lumbar radiculopathy without completion of needle exam of the left lower extremity. Cannot evaluate for pure sensory radiculopathy or small fiber neuropathy by electrodiagnostic technique.      Previous treatments: medications. , ztlido itching, dizziness, lethargy with tizanidine                                        Potential Aberrant Drug-Related Behavior:   None      Urine Drug Screening:  None      OARRS report:  02/2021 consistent    Past Medical History: Reviewed    Past Surgical History: Reviewed     Home Medications: Reviewed    Allergies: Reviewed     Social History: Reviewed     REVIEW OF SYSTEMS:     Reggy Heads denies fever/chills, chest pain, shortness of breath, new bowel or bladder complaints. All other review of systems was negative. PHYSICAL EXAMINATION:     General:       A & O x3    Lungs:    Breathing:Normal expansion. Clear to auscultation. No rales, rhonchi, or wheezing. Impression:  Chronic low back pain with radiation to the right lower extremity with numbness in the right leg. Lumbar spine MRI 2020 L2-3 right lateral disc extrusion and L4-5 disc bulge with facet hypertrophy and foraminal stenosis.     Plan:  Follow up on low back pain with intermittent right lateral leg radicular sx with weakness and numbness and tingling to right calf and foot,  1/4/21:S/P Bilateral Lumbar facet medial branch block L345 with >70% relief but side effects noted   Wants to try Right L4-L5 TRANSFORAMINAL EPIDURAL STEROID INJECTION with sedation   Continue gabapentin 400mg po bid with 2 refills  Continue Duloxetine 30mg po daily with 2 refills  Continue Robaxin 750mg po bid with 2 refills  NSAIDS contraindicated due to allergy to ASA  Continue with OTC pain medications as tolerated  OARRS report reviewed 02/2021. Patient encouraged to stay active and to lose weight. Failed physical therapy. Treatment plan discussed with the patient including medications side effects.     We discussed with the patient that combining opioids, benzodiazepines, alcohol, illicit drugs or sleep aids increases the risk of respiratory depression including death. We discussed that these medications may cause drowsiness, sedation or dizziness and have counseled the patient not to drive or operate machinery. We have discussed that these medications will be prescribed only by one provider. We have discussed with the patient about age related risk factors and have thoroughly discussed the importance of taking these medications as prescribed. The patient verbalizes understanding.    Lyndsay Bhaktaly advised regarding steps to help prevent the spread of COVID-19   SOURCE - https://zaida-gonsalo.info/. html     1-Stay home except to get medical care  2-Clean your hands often for atleast 20 secnds, avoid touching: Avoid touching your eyes, nose, and mouth with unwashed hands. 3-Seek medical attention: Seek prompt medical attention if your illness is worsening (e.g., difficulty breathing).   Call you doctor first. 3-Wear a facemask if you are sick   4-Cover your coughs and sneezes        I affirm this is a Patient Initiated Episode with an Established Patient who has not had a related appointment within my department in the past 7 days or scheduled within the next 24 hours.     Total Time: minutes: 5-10 minutes    Note: not billable if this call serves to triage the patient into an appointment for the relevant concern

## 2021-02-02 ENCOUNTER — TELEPHONE (OUTPATIENT)
Dept: PAIN MANAGEMENT | Age: 51
End: 2021-02-02

## 2021-02-02 NOTE — TELEPHONE ENCOUNTER
Call to Mary Ellen Bajwa that procedure was approved for 2/15/2021 and that the surgery center should call her a few days before for the pre op call and after 3:00 PM the business day before with the arrival time. Instructed Ruthann Valdez to hold ibuprofen for 24 hours, naprosyn for 4 days and any aspirin containing products for 7 days. Instructed to call office back if any questions. Instructed of need for COVID-19 testing and self quarantining. Ruthann Valdez verbalized understanding.     Ruthann Valdez 's response to the following screening questions:    Fever: No  Headache:  No  Cough: No  Shortness of breath: No  Exposed to anyone with these symptoms: No

## 2021-02-08 ENCOUNTER — HOSPITAL ENCOUNTER (OUTPATIENT)
Age: 51
Discharge: HOME | End: 2021-02-10
Payer: COMMERCIAL

## 2021-02-08 DIAGNOSIS — M54.16 LUMBAR RADICULOPATHY: ICD-10-CM

## 2021-02-08 PROCEDURE — U0003 INFECTIOUS AGENT DETECTION BY NUCLEIC ACID (DNA OR RNA); SEVERE ACUTE RESPIRATORY SYNDROME CORONAVIRUS 2 (SARS-COV-2) (CORONAVIRUS DISEASE [COVID-19]), AMPLIFIED PROBE TECHNIQUE, MAKING USE OF HIGH THROUGHPUT TECHNOLOGIES AS DESCRIBED BY CMS-2020-01-R: HCPCS

## 2021-02-10 LAB
SARS-COV-2: NOT DETECTED
SOURCE: NORMAL

## 2021-02-12 ENCOUNTER — ANESTHESIA EVENT (OUTPATIENT)
Dept: OPERATING ROOM | Age: 51
End: 2021-02-12
Payer: COMMERCIAL

## 2021-02-12 NOTE — ANESTHESIA PRE PROCEDURE
Department of Anesthesiology  Preprocedure Note       Name:  Mary Ellen Bajwa   Age:  53 y.o.  :  1970                                          MRN:  [id number]         Date:  2021      Surgeon: Bal Iraheta):  Beni Encinas MD    Procedure: Procedure(s):  RIGHT L4-L5 TRANSFORAMINAL EPIDURAL STEROID INJECTION WITH SEDATION  (CPT 68805)    Medications prior to admission:   Prior to Admission medications    Medication Sig Start Date End Date Taking? Authorizing Provider   methocarbamol (ROBAXIN) 750 MG tablet Take 750 mg by mouth 2 times daily    Historical Provider, MD   DULoxetine (CYMBALTA) 30 MG extended release capsule Take 1 capsule by mouth daily 1/11/21 2/10/21  SCAR Thompson Cha, CNP   gabapentin (NEURONTIN) 400 MG capsule Take 1 capsule by mouth 2 times daily for 30 days. 1/11/21 2/10/21  SCAR Thompson Cha, CNP   ALBUTEROL IN Inhale into the lungs as needed    Historical Provider, MD   acetaminophen (TYLENOL) 325 MG tablet Take 650 mg by mouth every 6 hours as needed for Pain    Historical Provider, MD       Current medications:    Current Outpatient Medications   Medication Sig Dispense Refill    methocarbamol (ROBAXIN) 750 MG tablet Take 750 mg by mouth 2 times daily      DULoxetine (CYMBALTA) 30 MG extended release capsule Take 1 capsule by mouth daily 30 capsule 2    gabapentin (NEURONTIN) 400 MG capsule Take 1 capsule by mouth 2 times daily for 30 days. 60 capsule 2    ALBUTEROL IN Inhale into the lungs as needed      acetaminophen (TYLENOL) 325 MG tablet Take 650 mg by mouth every 6 hours as needed for Pain       No current facility-administered medications for this visit. Allergies: Allergies   Allergen Reactions    Aspirin Shortness Of Breath     States lip swelling, trouble breathing      Codeine Shortness Of Breath     States also passes out.     Synthroid [Levothyroxine Sodium] Swelling       Problem List:    Patient Active Problem List   Diagnosis Code  Chronic pain syndrome G89.4    Lumbar disc disorder M51.9    Lumbar facet arthropathy M47.816    Lumbar radiculopathy M54.16    Pain in right leg M79.604    Pain in thoracic spine M54.6    Lumbar spondylolysis M43.06       Past Medical History:        Diagnosis Date    Arthritis     Asthma     Cancer ([facility] [address] dx 2010    partial removal [facility] ( no chemo or radiation  in remission)    Fibromyalgia     Headache     migraines    Low back pain     Seizure ([facility] [address]     noneptilectic  stress related  none since 2017  on no meds       Past Surgical History:        Procedure Laterality Date    ANESTHESIA NERVE BLOCK Right 06/29/2020    RIGHT L2 AND L4 TRANSFORAMINAL EPIDURAL STEROID INJECTION (CPT 44238,96640) performed by Carole Rouse MD at [address] [place] [address] Right 06/29/2020    transforaminal L2 and L4    NERVE BLOCK Bilateral 01/04/2021    BILATERAL LUMBAR FACET MEDIAL BRANCH BLOCK L3,4,5 WITH IV SEDATION (CPT 08320) performed by Carole Rouse MD at [address] History:    Social History     Tobacco Use    Smoking status: Never Smoker    Smokeless tobacco: Never Used   Substance Use Topics    Alcohol use: No                                Counseling given: Not Answered      Vital Signs (Current): There were no vitals filed for this visit.                                            BP Readings from Last 3 Encounters:   01/11/21 120/80   01/04/21 (!) 140/78   01/04/21 134/61       NPO Status:  >8.H                                                                               BMI:   Wt Readings from Last 3 Encounters:   01/11/21 235 lb (106.6 kg)   12/21/20 235 lb (106.6 kg)   12/15/20 230 lb (104.3 kg)     There is no height or weight on file to calculate BMI.    CBC:   Lab Results   Component Value Date    WBC 9.5 10/17/2016    RBC 4.99 10/17/2016    HGB 14.3 10/17/2016 ASA 2       Induction: intravenous. Anesthetic plan and risks discussed with patient. Plan discussed with CRNA. Attending anesthesiologist reviewed and agrees with Pre Eval content      PAT Chart Review:  Chart reviewed per routine by Jo Caballero MD.  Above represents information available via shared medical record including previous anesthesia history, drug and allergy history.   Confirmation of above and final plan per Day of Surgery (DOS) anesthesiologist.  .     Jo Caballero MD   2/12/2021

## 2021-02-15 ENCOUNTER — HOSPITAL ENCOUNTER (OUTPATIENT)
Dept: OPERATING ROOM | Age: 51
Setting detail: OUTPATIENT SURGERY
Discharge: HOME | End: 2021-02-15
Attending: PAIN MEDICINE
Payer: COMMERCIAL

## 2021-02-15 ENCOUNTER — HOSPITAL ENCOUNTER (OUTPATIENT)
Age: 51
Setting detail: OUTPATIENT SURGERY
Discharge: HOME | End: 2021-02-15
Attending: PAIN MEDICINE | Admitting: PAIN MEDICINE
Payer: COMMERCIAL

## 2021-02-15 ENCOUNTER — ANESTHESIA (OUTPATIENT)
Dept: OPERATING ROOM | Age: 51
End: 2021-02-15
Payer: COMMERCIAL

## 2021-02-15 VITALS
RESPIRATION RATE: 18 BRPM | BODY MASS INDEX: 39.21 KG/M2 | HEIGHT: 66 IN | TEMPERATURE: 97.5 F | OXYGEN SATURATION: 97 % | DIASTOLIC BLOOD PRESSURE: 69 MMHG | SYSTOLIC BLOOD PRESSURE: 135 MMHG | WEIGHT: 244 LBS | HEART RATE: 69 BPM

## 2021-02-15 VITALS — OXYGEN SATURATION: 99 % | TEMPERATURE: 98.6 F | RESPIRATION RATE: 14 BRPM

## 2021-02-15 DIAGNOSIS — M54.16 LUMBAR RADICULOPATHY: Primary | ICD-10-CM

## 2021-02-15 DIAGNOSIS — M51.36 LUMBAR DEGENERATIVE DISC DISEASE: ICD-10-CM

## 2021-02-15 PROCEDURE — 3600000005 HC SURGERY LEVEL 5 BASE: Performed by: PAIN MEDICINE

## 2021-02-15 PROCEDURE — 6360000002 HC RX W HCPCS: Performed by: PAIN MEDICINE

## 2021-02-15 PROCEDURE — 2500000003 HC RX 250 WO HCPCS: Performed by: PAIN MEDICINE

## 2021-02-15 PROCEDURE — 2580000003 HC RX 258: Performed by: ANESTHESIOLOGY

## 2021-02-15 PROCEDURE — 2709999900 HC NON-CHARGEABLE SUPPLY: Performed by: PAIN MEDICINE

## 2021-02-15 PROCEDURE — 3700000000 HC ANESTHESIA ATTENDED CARE: Performed by: PAIN MEDICINE

## 2021-02-15 PROCEDURE — 7100000011 HC PHASE II RECOVERY - ADDTL 15 MIN: Performed by: PAIN MEDICINE

## 2021-02-15 PROCEDURE — 3209999900 FLUORO FOR SURGICAL PROCEDURES

## 2021-02-15 PROCEDURE — 6360000002 HC RX W HCPCS: Performed by: NURSE ANESTHETIST, CERTIFIED REGISTERED

## 2021-02-15 PROCEDURE — 64484 NJX AA&/STRD TFRM EPI L/S EA: CPT | Performed by: PAIN MEDICINE

## 2021-02-15 PROCEDURE — 64483 NJX AA&/STRD TFRM EPI L/S 1: CPT | Performed by: PAIN MEDICINE

## 2021-02-15 PROCEDURE — 6360000004 HC RX CONTRAST MEDICATION: Performed by: PAIN MEDICINE

## 2021-02-15 PROCEDURE — 7100000010 HC PHASE II RECOVERY - FIRST 15 MIN: Performed by: PAIN MEDICINE

## 2021-02-15 RX ORDER — MEPERIDINE HYDROCHLORIDE 25 MG/ML
12.5 INJECTION INTRAMUSCULAR; INTRAVENOUS; SUBCUTANEOUS EVERY 5 MIN PRN
Status: DISCONTINUED | OUTPATIENT
Start: 2021-02-15 | End: 2021-02-15 | Stop reason: HOSPADM

## 2021-02-15 RX ORDER — SODIUM CHLORIDE, SODIUM LACTATE, POTASSIUM CHLORIDE, CALCIUM CHLORIDE 600; 310; 30; 20 MG/100ML; MG/100ML; MG/100ML; MG/100ML
INJECTION, SOLUTION INTRAVENOUS CONTINUOUS
Status: DISCONTINUED | OUTPATIENT
Start: 2021-02-15 | End: 2021-02-15 | Stop reason: HOSPADM

## 2021-02-15 RX ORDER — HYDRALAZINE HYDROCHLORIDE 20 MG/ML
5 INJECTION INTRAMUSCULAR; INTRAVENOUS EVERY 10 MIN PRN
Status: DISCONTINUED | OUTPATIENT
Start: 2021-02-15 | End: 2021-02-15 | Stop reason: HOSPADM

## 2021-02-15 RX ORDER — FENTANYL CITRATE 50 UG/ML
INJECTION, SOLUTION INTRAMUSCULAR; INTRAVENOUS PRN
Status: DISCONTINUED | OUTPATIENT
Start: 2021-02-15 | End: 2021-02-15 | Stop reason: SDUPTHER

## 2021-02-15 RX ORDER — LABETALOL HYDROCHLORIDE 5 MG/ML
5 INJECTION, SOLUTION INTRAVENOUS EVERY 10 MIN PRN
Status: DISCONTINUED | OUTPATIENT
Start: 2021-02-15 | End: 2021-02-15 | Stop reason: HOSPADM

## 2021-02-15 RX ORDER — PROMETHAZINE HYDROCHLORIDE 25 MG/ML
25 INJECTION, SOLUTION INTRAMUSCULAR; INTRAVENOUS
Status: DISCONTINUED | OUTPATIENT
Start: 2021-02-15 | End: 2021-02-15 | Stop reason: HOSPADM

## 2021-02-15 RX ORDER — MIDAZOLAM HYDROCHLORIDE 1 MG/ML
INJECTION INTRAMUSCULAR; INTRAVENOUS PRN
Status: DISCONTINUED | OUTPATIENT
Start: 2021-02-15 | End: 2021-02-15 | Stop reason: SDUPTHER

## 2021-02-15 RX ORDER — DIPHENHYDRAMINE HYDROCHLORIDE 50 MG/ML
12.5 INJECTION INTRAMUSCULAR; INTRAVENOUS
Status: DISCONTINUED | OUTPATIENT
Start: 2021-02-15 | End: 2021-02-15 | Stop reason: HOSPADM

## 2021-02-15 RX ORDER — LIDOCAINE HYDROCHLORIDE 5 MG/ML
INJECTION, SOLUTION INFILTRATION; INTRAVENOUS PRN
Status: DISCONTINUED | OUTPATIENT
Start: 2021-02-15 | End: 2021-02-15 | Stop reason: ALTCHOICE

## 2021-02-15 RX ADMIN — SODIUM CHLORIDE, POTASSIUM CHLORIDE, SODIUM LACTATE AND CALCIUM CHLORIDE: 600; 310; 30; 20 INJECTION, SOLUTION INTRAVENOUS at 09:18

## 2021-02-15 RX ADMIN — FENTANYL CITRATE 50 MCG: 50 INJECTION, SOLUTION INTRAMUSCULAR; INTRAVENOUS at 10:07

## 2021-02-15 RX ADMIN — FENTANYL CITRATE 50 MCG: 50 INJECTION, SOLUTION INTRAMUSCULAR; INTRAVENOUS at 10:06

## 2021-02-15 RX ADMIN — MIDAZOLAM 2 MG: 1 INJECTION INTRAMUSCULAR; INTRAVENOUS at 10:06

## 2021-02-15 ASSESSMENT — LIFESTYLE VARIABLES: SMOKING_STATUS: 0

## 2021-02-15 ASSESSMENT — PAIN DESCRIPTION - LOCATION: LOCATION: BACK

## 2021-02-15 ASSESSMENT — PAIN DESCRIPTION - DESCRIPTORS
DESCRIPTORS: ACHING;CONSTANT
DESCRIPTORS: SHARP

## 2021-02-15 ASSESSMENT — PAIN DESCRIPTION - FREQUENCY: FREQUENCY: INTERMITTENT

## 2021-02-15 ASSESSMENT — PAIN SCALES - GENERAL
PAINLEVEL_OUTOF10: 0
PAINLEVEL_OUTOF10: 0

## 2021-02-15 NOTE — ANESTHESIA POSTPROCEDURE EVALUATION
Department of Anesthesiology  Postprocedure Note    Patient: Miles Ramsey  MRN: [id number]  YOB: 1970  Date of evaluation: 2/15/2021  Time:  12:06 PM     Procedure Summary     Date: 02/15/21 Room / Location: [address] [place] [address] 04 / [facility]    Anesthesia Start: 1005 Anesthesia Stop: 1152    Procedure: RIGHT L2, L4 TRANSFORAMINAL EPIDURAL STEROID INJECTION WITH SEDATION  (CPT 61998) (Right ) Diagnosis: (LUMBAR RADICULOPATHY)    Surgeons: Thea Rodas MD Responsible Provider: Pedrito Angelo MD    Anesthesia Type: MAC ASA Status: 2          Anesthesia Type: MAC    Alphonse Phase I: Alphonse Score: 10    Alphonse Phase II: Alphonse Score: 10    Last vitals: Reviewed and per EMR flowsheets.        Anesthesia Post Evaluation    Patient location during evaluation: PACU  Patient participation: complete - patient participated  Level of consciousness: awake and alert  Airway patency: patent  Nausea & Vomiting: no nausea and no vomiting  Complications: no  Cardiovascular status: hemodynamically stable  Respiratory status: room air and spontaneous ventilation  Hydration status: stable

## 2021-02-15 NOTE — OP NOTE
2/15/2021    Patient: Ronald Luo  :  1970  Age:  53 y.o. Sex:  female     PRE-OPERATIVE DIAGNOSIS: Lumbar disc displacement, lumbar neural foraminal stenosis, lumbar radiculopathy. POST-OPERATIVE DIAGNOSIS: Same. PROCEDURE: Right Transforaminal epidural steroid injection under fluoroscopic guidance at foraminal level L2 and L4 (#2). SURGEON: MINDY Harper M.D. ANESTHESIA: MAC    ESTIMATED BLOOD LOSS: None.  ______________________________________________________________________  BRIEF HISTORY: Ronald Luo comes in today for the second Right transforaminal epidural steroid injection under fluoroscopic guidance at foraminal level L2 and L4 . The potential complications of this procedure were discussed with her again today. She has elected to undergo the aforementioned procedure. Stella complete History & Physical examination were reviewed in depth, a copy of which is in the chart. DESCRIPTION OF PROCEDURE:    After confirming written and informed consent, a time-out was performed and Stella name and date of birth, the procedure to be performed as well as the plan for the location of the needle insertion were confirmed. The patient was brought into the procedure room and placed in the prone position on the fluoroscopy table. Standard monitors were placed and vital signs were observed throughout the procedure. The area of the lumbar spine was prepped with chloraprep and draped in a sterile manner. The vertebral body was identified with AP fluoroscopy. An oblique view was obtained to better visualize the inferior junction of the pedicle and transverse process . The 6 o'clock position of the pedicle was marked and identified. The skin and subcutaneous tissue were anesthetized with 0.5% lidocaine. A # 22 gauge pencil point needle was directed toward the targeted point under fluoroscopy until bone was contacted.  The needle was then walked inferiorly until the neural foramen was entered

## 2021-02-15 NOTE — H&P
[facility]  [address] [facility], [address] [place] [address]  [phone]    Procedure History & Physical      Awa Harrington     HPI:    Patient  is here for low back and right leg pain for Right L2 and L4 TFESI  Labs/imaging studies reviewed   All question and concerns addressed including R/B/A associated with the procedure    Past Medical History:   Diagnosis Date    Arthritis     Asthma     Cancer ([facility] [address] dx 2010    partial removal [facility] avalos ( no chemo or radiation  in remission)    Fibromyalgia     Headache     migraines    Low back pain     Seizure ([facility] [address]     noneptilectic  stress related  none since 2017  on no meds       Past Surgical History:   Procedure Laterality Date    ANESTHESIA NERVE BLOCK Right 06/29/2020    RIGHT L2 AND L4 TRANSFORAMINAL EPIDURAL STEROID INJECTION (CPT 75733,71784) performed by Miguel Michel MD at [facility] LIVER RESECTION      NERVE BLOCK Right 06/29/2020    transforaminal L2 and L4    NERVE BLOCK Bilateral 01/04/2021    BILATERAL LUMBAR FACET MEDIAL BRANCH BLOCK L3,4,5 WITH IV SEDATION (CPT O8277919) performed by Miguel Michel MD at [address] [place] [address]       Prior to Admission medications    Medication Sig Start Date End Date Taking? Authorizing Provider   methocarbamol (ROBAXIN) 750 MG tablet Take 750 mg by mouth 2 times daily    Historical Provider, MD   DULoxetine (CYMBALTA) 30 MG extended release capsule Take 1 capsule by mouth daily 1/11/21 2/10/21  Yoel Hendrix, APRN - CNP   gabapentin (NEURONTIN) 400 MG capsule Take 1 capsule by mouth 2 times daily for 30 days.  1/11/21 2/10/21  SCAR Mata - CNP   ALBUTEROL IN Inhale into the lungs as needed    Historical Provider, MD   acetaminophen (TYLENOL) 325 MG tablet Take 650 mg by mouth every 6 hours as needed for Pain    Historical Provider, MD       Allergies   Allergen Reactions    Aspirin Shortness Of Breath     States lip swelling, trouble breathing      Codeine Shortness Of Breath     States also passes out.     Synthroid [Levothyroxine Sodium] Swelling       Social History     Socioeconomic History    Marital status:      Spouse name: Not on file    Number of children: Not on file    Years of education: Not on file    Highest education level: Not on file   Occupational History    Not on file   Social Needs    Financial resource strain: Not on file    Food insecurity     Worry: Not on file     Inability: Not on file    Transportation needs     Medical: Not on file     Non-medical: Not on file   Tobacco Use    Smoking status: Never Smoker    Smokeless tobacco: Never Used   Substance and Sexual Activity    Alcohol use: No    Drug use: No    Sexual activity: Yes     Partners: Male   Lifestyle    Physical activity     Days per week: Not on file     Minutes per session: Not on file    Stress: Not on file   Relationships    Social connections     Talks on phone: Not on file     Gets together: Not on file     Attends Muslim service: Not on file     Active member of club or organization: Not on file     Attends meetings of clubs or organizations: Not on file     Relationship status: Not on file    Intimate partner violence     Fear of current or ex partner: Not on file     Emotionally abused: Not on file     Physically abused: Not on file     Forced sexual activity: Not on file   Other Topics Concern    Not on file   Social History Narrative    Not on file       Family History   Problem Relation Age of Onset    Heart Failure Mother     Cancer Brother     Arthritis Brother     Coronary Art Dis Brother     Diabetes Sister     Hypertension Sister     Arthritis Sister          REVIEW OF SYSTEMS:    CONSTITUTIONAL:  negative for  fevers, chills, sweats and fatigue    RESPIRATORY:  negative for  dry cough, cough with sputum, dyspnea, wheezing and chest pain    CARDIOVASCULAR:  negative for chest pain, dyspnea, palpitations, syncope    GASTROINTESTINAL:  negative for nausea, vomiting, change in bowel habits, diarrhea, constipation and abdominal pain    MUSCULOSKELETAL: negative for muscle weakness    SKIN: negative for itching or rashes. BEHAVIOR/PSYCH:  negative for poor appetite, increased appetite, decreased sleep and poor concentration    All other systems negative      PHYSICAL EXAM:    VITALS:  BP (!) 153/77   Pulse 68   Temp 97.5 °F (36.4 °C) (Temporal)   Resp 16   Ht 5' 6\" (1.676 m)   Wt 244 lb (110.7 kg)   SpO2 97%   BMI 39.38 kg/m²     CONSTITUTIONAL:  awake, alert, cooperative, no apparent distress, and appears stated age    EYES: PERRLA, EOMI    LUNGS:  No increased work of breathing, no audible wheezing    CARDIOVASCULAR:  regular rate and rhythm    ABDOMEN:  Soft non tender non distended     EXTREMITIES: no signs of clubbing or cyanosis. MUSCULOSKELETAL: negative for flaccid muscle tone or spastic movements. SKIN: gross examination reveals no signs of rashes, or diaphoresis. NEURO: Cranial nerves II-XII grossly intact. No signs of agitated mood. Assessment/Plan:    Low back and right leg pain for right L2 and L4 TFESI.

## 2021-03-01 ENCOUNTER — PREP FOR PROCEDURE (OUTPATIENT)
Dept: PAIN MANAGEMENT | Age: 51
End: 2021-03-01

## 2021-03-01 ENCOUNTER — VIRTUAL VISIT (OUTPATIENT)
Dept: PAIN MANAGEMENT | Age: 51
End: 2021-03-01
Payer: COMMERCIAL

## 2021-03-01 DIAGNOSIS — M51.9 LUMBAR DISC DISORDER: ICD-10-CM

## 2021-03-01 DIAGNOSIS — M47.816 LUMBAR FACET ARTHROPATHY: ICD-10-CM

## 2021-03-01 DIAGNOSIS — M54.6 PAIN IN THORACIC SPINE: ICD-10-CM

## 2021-03-01 DIAGNOSIS — M54.16 LUMBAR RADICULOPATHY: Primary | ICD-10-CM

## 2021-03-01 DIAGNOSIS — M43.06 LUMBAR SPONDYLOLYSIS: ICD-10-CM

## 2021-03-01 DIAGNOSIS — G89.4 CHRONIC PAIN SYNDROME: ICD-10-CM

## 2021-03-01 DIAGNOSIS — M79.604 PAIN IN RIGHT LEG: ICD-10-CM

## 2021-03-01 PROCEDURE — 99441 PR PHYS/QHP TELEPHONE EVALUATION 5-10 MIN: CPT | Performed by: NURSE PRACTITIONER

## 2021-03-01 NOTE — PROGRESS NOTES
Lumbar spine MRI 02/2020  Degenerative changes of the lumbar spine as discussed level by level in the body of the report.  No significant change since prior examination.  Findings are most pronounced at L4-5 with mild to moderate left and mild right neural foraminal stenosis, unchanged.  Right far lateral disc extrusion at L2-3 abutting exiting nerve roots, unchanged.  No significant spinal canal narrowing. Right lower extremity EMG:  This study was Abnormal.    1. There is electrodiagnostic evidence of right lumbar motor radiculopathy, as indicated by abnormal right lumbar paraspinal testing. This is not further defined, however, as all tested right lower extremity muscles are normal.      2. There is no electrodiagnostic evidence of any other peripheral nerve mononeuropathy, plexopathy, or peripheral polyneuropathy in the bilateral lower extremities. Cannot evaluate for left lumbar radiculopathy without completion of needle exam of the left lower extremity. Cannot evaluate for pure sensory radiculopathy or small fiber neuropathy by electrodiagnostic technique.      Previous treatments: medications. , ztlido itching, dizziness, lethargy with tizanidine                                        Potential Aberrant Drug-Related Behavior:   None      Urine Drug Screening:  None      OARRS report:  03/2021 consistent    Past Medical History: Reviewed    Past Surgical History: Reviewed     Home Medications: Reviewed    Allergies: Reviewed     Social History: Reviewed     REVIEW OF SYSTEMS:     Merly Radha denies fever/chills, chest pain, shortness of breath, new bowel or bladder complaints. All other review of systems was negative. PHYSICAL EXAMINATION:     General:       A & O x3    Lungs:    Breathing:Normal expansion. Clear to auscultation. No rales, rhonchi, or wheezing. Impression:  Chronic low back pain with radiation to the right lower extremity with numbness in the right leg.   Lumbar spine MRI 2020 L2-3 right lateral disc extrusion and L4-5 disc bulge with facet hypertrophy and foraminal stenosis. Plan:  Followed for lbp back pain with intermittent right lateral leg radicular sx to knee with some weakness   1/4/21:Bilateral Lumbar facet medial branch block L345 with >70% relief but side effects headache and n/t foot  2/15/21: s/p Right L4-L5 TRANSFORAMINAL EPIDURAL STEROID INJECTION with>80% relief x1 week and now >50%   Schedule for repeat Right L4-L5 TRANSFORAMINAL EPIDURAL STEROID INJECTION with sedation  Continue gabapentin 400mg po bid with 2 refills  Continue Duloxetine 30mg po daily with 2 refills  Continue Robaxin 750mg po bid with 2 refills  NSAIDS contraindicated due to allergy to ASA  Continue with OTC pain medications as tolerated  OARRS report reviewed 03/2021. Patient encouraged to stay active and to lose weight. Failed physical therapy. Treatment plan discussed with the patient including medications side effects.     We discussed with the patient that combining opioids, benzodiazepines, alcohol, illicit drugs or sleep aids increases the risk of respiratory depression including death. We discussed that these medications may cause drowsiness, sedation or dizziness and have counseled the patient not to drive or operate machinery. We have discussed that these medications will be prescribed only by one provider. We have discussed with the patient about age related risk factors and have thoroughly discussed the importance of taking these medications as prescribed. The patient verbalizes understanding.    Jennifer Fuller advised regarding steps to help prevent the spread of COVID-19   SOURCE - https://zaida-gonsalo.info/. html     1-Stay home except to get medical care  2-Clean your hands often for atleast 20 secnds, avoid touching: Avoid touching your eyes, nose, and mouth with unwashed hands.   3-Seek medical attention: Seek prompt medical attention if your illness is

## 2021-03-01 NOTE — PROGRESS NOTES
Norah Abdullahi was read the following message We want to confirm that, for purposes of billing, this is a virtual visit with your provider for which we will submit a claim for reimbursement with your insurance company. You will be responsible for any copays, coinsurance amounts or other amounts not covered by your insurance company. If you do not accept this, unfortunately we will not be able to schedule or proceed with a virtual visit with the provider. Do you accept? Francesca Ling responded Yes .

## 2021-03-15 RX ORDER — GABAPENTIN 400 MG/1
400 CAPSULE ORAL 2 TIMES DAILY
COMMUNITY
End: 2021-04-09 | Stop reason: DRUGHIGH

## 2021-03-15 RX ORDER — DULOXETIN HYDROCHLORIDE 30 MG/1
30 CAPSULE, DELAYED RELEASE ORAL DAILY
COMMUNITY
End: 2021-04-09 | Stop reason: SDUPTHER

## 2021-03-16 ENCOUNTER — HOSPITAL ENCOUNTER (OUTPATIENT)
Age: 51
Discharge: HOME | End: 2021-03-18
Payer: COMMERCIAL

## 2021-03-16 DIAGNOSIS — M54.16 LUMBAR RADICULOPATHY: ICD-10-CM

## 2021-03-16 PROCEDURE — U0003 INFECTIOUS AGENT DETECTION BY NUCLEIC ACID (DNA OR RNA); SEVERE ACUTE RESPIRATORY SYNDROME CORONAVIRUS 2 (SARS-COV-2) (CORONAVIRUS DISEASE [COVID-19]), AMPLIFIED PROBE TECHNIQUE, MAKING USE OF HIGH THROUGHPUT TECHNOLOGIES AS DESCRIBED BY CMS-2020-01-R: HCPCS

## 2021-03-17 LAB
SARS-COV-2: NOT DETECTED
SOURCE: NORMAL

## 2021-03-19 ENCOUNTER — ANESTHESIA EVENT (OUTPATIENT)
Dept: OPERATING ROOM | Age: 51
End: 2021-03-19
Payer: COMMERCIAL

## 2021-03-19 NOTE — ANESTHESIA PRE PROCEDURE
Department of Anesthesiology  Preprocedure Note       Name:  Nette Cruz   Age:  53 y.o.  :  1970                                          MRN:  [id number]         Date:  3/19/2021      Surgeon: Jamie Phillips):  Glo Woodall MD    Procedure: Procedure(s):  RIGHT L4-5 TRANSFORAMINAL EPIDURAL STEROID INJECTION WITH SEDATION  (CPT 13122)    Medications prior to admission:   Prior to Admission medications    Medication Sig Start Date End Date Taking? Authorizing Provider   DULoxetine (CYMBALTA) 30 MG extended release capsule Take 30 mg by mouth daily   Yes Historical Provider, MD   gabapentin (NEURONTIN) 400 MG capsule Take 400 mg by mouth 2 times daily. Yes Historical Provider, MD   methocarbamol (ROBAXIN) 750 MG tablet Take 750 mg by mouth 2 times daily    Historical Provider, MD   ALBUTEROL IN Inhale into the lungs as needed    Historical Provider, MD   acetaminophen (TYLENOL) 325 MG tablet Take 650 mg by mouth every 6 hours as needed for Pain    Historical Provider, MD       Current medications:    No current facility-administered medications for this encounter. Current Outpatient Medications   Medication Sig Dispense Refill    DULoxetine (CYMBALTA) 30 MG extended release capsule Take 30 mg by mouth daily      gabapentin (NEURONTIN) 400 MG capsule Take 400 mg by mouth 2 times daily.  methocarbamol (ROBAXIN) 750 MG tablet Take 750 mg by mouth 2 times daily      ALBUTEROL IN Inhale into the lungs as needed      acetaminophen (TYLENOL) 325 MG tablet Take 650 mg by mouth every 6 hours as needed for Pain         Allergies: Allergies   Allergen Reactions    Aspirin Shortness Of Breath     States lip swelling, trouble breathing      Codeine Shortness Of Breath     States also passes out.    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[facility] avalos ( no chemo or radiation  in remission)    Fibromyalgia     Headache     migraines    Low back pain     Seizure ([facility] [address]     noneptilectic  stress related  none since 2017  on no meds       Past Surgical History:        Procedure Laterality Date    ANESTHESIA NERVE BLOCK Right 06/29/2020    RIGHT L2 AND L4 TRANSFORAMINAL EPIDURAL STEROID INJECTION (CPT 51884,54415) performed by Jose Topete MD at [address] [place] [address] Right 06/29/2020    transforaminal L2 and L4    NERVE BLOCK Bilateral 01/04/2021    BILATERAL LUMBAR FACET MEDIAL BRANCH BLOCK L3,4,5 WITH IV SEDATION (CPT 19955) performed by Jose Topete MD at [facility] Right 2/15/2021    RIGHT L2, L4 TRANSFORAMINAL EPIDURAL STEROID INJECTION WITH SEDATION  (CPT 90305) performed by Jose Topete MD at [address] [place] [address] History:    Social History     Tobacco Use    Smoking status: Never Smoker    Smokeless tobacco: Never Used   Substance Use Topics    Alcohol use: No                                Counseling given: Not Answered      Vital Signs (Current):   Vitals:    03/15/21 1057   Weight: 245 lb (111.1 kg)   Height: 5' 6\" (1.676 m)                                              BP Readings from Last 3 Encounters:   02/15/21 135/69   01/11/21 120/80   01/04/21 (!) 140/78       NPO Status:  >8.H                                                                               BMI:   Wt Readings from Last 3 Encounters:   02/15/21 244 lb (110.7 kg)   01/11/21 235 lb (106.6 kg)   12/21/20 235 lb (106.6 kg)     Body mass index is 39.54 kg/m².     CBC:   Lab Results Component Value Date    WBC 9.5 10/17/2016    RBC 4.99 10/17/2016    HGB 14.3 10/17/2016    HCT 41.8 10/17/2016    MCV 83.7 10/17/2016    RDW 12.8 10/17/2016     10/17/2016       CMP:   Lab Results   Component Value Date     10/17/2016    K 3.7 10/17/2016     10/17/2016    CO2 19 10/17/2016    BUN 16 10/17/2016    CREATININE 1.0 10/17/2016    GFRAA >60 10/17/2016    LABGLOM 60 10/17/2016    GLUCOSE 126 10/17/2016    CALCIUM 10.0 10/17/2016       POC Tests: No results for input(s): POCGLU, POCNA, POCK, POCCL, POCBUN, POCHEMO, POCHCT in the last 72 hours. Coags: No results found for: PROTIME, INR, APTT    HCG (If Applicable): No results found for: PREGTESTUR, PREGSERUM, HCG, HCGQUANT     ABGs: No results found for: PHART, PO2ART, NZI1SVO, ICP3QHG, BEART, Z8CBDQSG     Type & Screen (If Applicable):  No results found for: LABABO, LABRH    Drug/Infectious Status (If Applicable):  No results found for: HIV, HEPCAB    COVID-19 Screening (If Applicable):   Lab Results   Component Value Date    COVID19 Not Detected 03/16/2021           Anesthesia Evaluation  Patient summary reviewed  Airway: Mallampati: II  TM distance: >3 FB   Neck ROM: full  Comment: Fat neck  Mouth opening: > = 3 FB Dental: normal exam         Pulmonary: breath sounds clear to auscultation  (+) asthma:     (-) not a current smoker                           Cardiovascular:  Exercise tolerance: good (>4 METS),           Rhythm: regular  Rate: normal                    Neuro/Psych:   (+) neuromuscular disease:, headaches:,             GI/Hepatic/Renal:   (+) morbid obesity          Endo/Other:                     Abdominal:   (+) obese,         Vascular:                                    Anesthesia Plan      MAC     ASA 2       Induction: intravenous. Anesthetic plan and risks discussed with patient. Plan discussed with CRNA.     Attending anesthesiologist reviewed and agrees with Pre Eval content            Mery Starks Andie Marvin MD   3/19/2021

## 2021-03-22 ENCOUNTER — ANESTHESIA (OUTPATIENT)
Dept: OPERATING ROOM | Age: 51
End: 2021-03-22
Payer: COMMERCIAL

## 2021-03-22 ENCOUNTER — HOSPITAL ENCOUNTER (OUTPATIENT)
Age: 51
Setting detail: OUTPATIENT SURGERY
Discharge: HOME | End: 2021-03-22
Attending: PAIN MEDICINE | Admitting: PAIN MEDICINE
Payer: COMMERCIAL

## 2021-03-22 ENCOUNTER — HOSPITAL ENCOUNTER (OUTPATIENT)
Dept: OPERATING ROOM | Age: 51
Setting detail: OUTPATIENT SURGERY
Discharge: HOME | End: 2021-03-22
Attending: PAIN MEDICINE
Payer: COMMERCIAL

## 2021-03-22 VITALS
WEIGHT: 248 LBS | TEMPERATURE: 96.6 F | HEART RATE: 74 BPM | DIASTOLIC BLOOD PRESSURE: 72 MMHG | SYSTOLIC BLOOD PRESSURE: 152 MMHG | BODY MASS INDEX: 39.86 KG/M2 | OXYGEN SATURATION: 96 % | RESPIRATION RATE: 18 BRPM | HEIGHT: 66 IN

## 2021-03-22 VITALS
DIASTOLIC BLOOD PRESSURE: 83 MMHG | TEMPERATURE: 98.6 F | SYSTOLIC BLOOD PRESSURE: 134 MMHG | RESPIRATION RATE: 16 BRPM | OXYGEN SATURATION: 100 %

## 2021-03-22 DIAGNOSIS — M51.36 LUMBAR DEGENERATIVE DISC DISEASE: ICD-10-CM

## 2021-03-22 DIAGNOSIS — M54.16 LUMBAR RADICULOPATHY: Primary | ICD-10-CM

## 2021-03-22 PROCEDURE — 6360000002 HC RX W HCPCS: Performed by: PAIN MEDICINE

## 2021-03-22 PROCEDURE — 6360000004 HC RX CONTRAST MEDICATION: Performed by: PAIN MEDICINE

## 2021-03-22 PROCEDURE — 3700000000 HC ANESTHESIA ATTENDED CARE: Performed by: PAIN MEDICINE

## 2021-03-22 PROCEDURE — 64483 NJX AA&/STRD TFRM EPI L/S 1: CPT | Performed by: PAIN MEDICINE

## 2021-03-22 PROCEDURE — 3209999900 FLUORO FOR SURGICAL PROCEDURES

## 2021-03-22 PROCEDURE — 2500000003 HC RX 250 WO HCPCS: Performed by: PAIN MEDICINE

## 2021-03-22 PROCEDURE — 3600000005 HC SURGERY LEVEL 5 BASE: Performed by: PAIN MEDICINE

## 2021-03-22 PROCEDURE — 2709999900 HC NON-CHARGEABLE SUPPLY: Performed by: PAIN MEDICINE

## 2021-03-22 PROCEDURE — 2580000003 HC RX 258: Performed by: ANESTHESIOLOGY

## 2021-03-22 PROCEDURE — 7100000010 HC PHASE II RECOVERY - FIRST 15 MIN: Performed by: PAIN MEDICINE

## 2021-03-22 PROCEDURE — 7100000011 HC PHASE II RECOVERY - ADDTL 15 MIN: Performed by: PAIN MEDICINE

## 2021-03-22 PROCEDURE — 6360000002 HC RX W HCPCS: Performed by: NURSE ANESTHETIST, CERTIFIED REGISTERED

## 2021-03-22 PROCEDURE — 64484 NJX AA&/STRD TFRM EPI L/S EA: CPT | Performed by: PAIN MEDICINE

## 2021-03-22 RX ORDER — MEPERIDINE HYDROCHLORIDE 25 MG/ML
12.5 INJECTION INTRAMUSCULAR; INTRAVENOUS; SUBCUTANEOUS EVERY 5 MIN PRN
Status: DISCONTINUED | OUTPATIENT
Start: 2021-03-22 | End: 2021-03-22 | Stop reason: HOSPADM

## 2021-03-22 RX ORDER — MIDAZOLAM HYDROCHLORIDE 1 MG/ML
INJECTION INTRAMUSCULAR; INTRAVENOUS PRN
Status: DISCONTINUED | OUTPATIENT
Start: 2021-03-22 | End: 2021-03-22 | Stop reason: SDUPTHER

## 2021-03-22 RX ORDER — SODIUM CHLORIDE, SODIUM LACTATE, POTASSIUM CHLORIDE, CALCIUM CHLORIDE 600; 310; 30; 20 MG/100ML; MG/100ML; MG/100ML; MG/100ML
INJECTION, SOLUTION INTRAVENOUS CONTINUOUS
Status: DISCONTINUED | OUTPATIENT
Start: 2021-03-22 | End: 2021-03-22 | Stop reason: HOSPADM

## 2021-03-22 RX ORDER — PROMETHAZINE HYDROCHLORIDE 25 MG/ML
25 INJECTION, SOLUTION INTRAMUSCULAR; INTRAVENOUS
Status: DISCONTINUED | OUTPATIENT
Start: 2021-03-22 | End: 2021-03-22 | Stop reason: HOSPADM

## 2021-03-22 RX ORDER — HYDRALAZINE HYDROCHLORIDE 20 MG/ML
5 INJECTION INTRAMUSCULAR; INTRAVENOUS EVERY 10 MIN PRN
Status: DISCONTINUED | OUTPATIENT
Start: 2021-03-22 | End: 2021-03-22 | Stop reason: HOSPADM

## 2021-03-22 RX ORDER — LIDOCAINE HYDROCHLORIDE 5 MG/ML
INJECTION, SOLUTION INFILTRATION; INTRAVENOUS PRN
Status: DISCONTINUED | OUTPATIENT
Start: 2021-03-22 | End: 2021-03-22 | Stop reason: ALTCHOICE

## 2021-03-22 RX ORDER — DIPHENHYDRAMINE HYDROCHLORIDE 50 MG/ML
12.5 INJECTION INTRAMUSCULAR; INTRAVENOUS
Status: DISCONTINUED | OUTPATIENT
Start: 2021-03-22 | End: 2021-03-22 | Stop reason: HOSPADM

## 2021-03-22 RX ORDER — FENTANYL CITRATE 50 UG/ML
INJECTION, SOLUTION INTRAMUSCULAR; INTRAVENOUS PRN
Status: DISCONTINUED | OUTPATIENT
Start: 2021-03-22 | End: 2021-03-22 | Stop reason: SDUPTHER

## 2021-03-22 RX ORDER — LABETALOL HYDROCHLORIDE 5 MG/ML
5 INJECTION, SOLUTION INTRAVENOUS EVERY 10 MIN PRN
Status: DISCONTINUED | OUTPATIENT
Start: 2021-03-22 | End: 2021-03-22 | Stop reason: HOSPADM

## 2021-03-22 RX ADMIN — FENTANYL CITRATE 50 MCG: 50 INJECTION, SOLUTION INTRAMUSCULAR; INTRAVENOUS at 10:13

## 2021-03-22 RX ADMIN — SODIUM CHLORIDE, POTASSIUM CHLORIDE, SODIUM LACTATE AND CALCIUM CHLORIDE: 600; 310; 30; 20 INJECTION, SOLUTION INTRAVENOUS at 08:54

## 2021-03-22 RX ADMIN — FENTANYL CITRATE 50 MCG: 50 INJECTION, SOLUTION INTRAMUSCULAR; INTRAVENOUS at 10:12

## 2021-03-22 RX ADMIN — MIDAZOLAM 2 MG: 1 INJECTION INTRAMUSCULAR; INTRAVENOUS at 10:11

## 2021-03-22 ASSESSMENT — PAIN DESCRIPTION - LOCATION: LOCATION: BACK

## 2021-03-22 ASSESSMENT — LIFESTYLE VARIABLES: SMOKING_STATUS: 0

## 2021-03-22 ASSESSMENT — PAIN DESCRIPTION - PAIN TYPE: TYPE: CHRONIC PAIN;SURGICAL PAIN

## 2021-03-22 ASSESSMENT — PAIN DESCRIPTION - DESCRIPTORS: DESCRIPTORS: ACHING;DISCOMFORT

## 2021-03-22 NOTE — H&P
[address]  [address] [facility], [address] [place] [address] Felice  [phone]    Procedure History & Physical      [facility]     HPI:    Patient  is here for low back and right lower extremity pain for Right L3 and 4 TFESI  Labs/imaging studies reviewed   All question and concerns addressed including R/B/A associated with the procedure    Past Medical History:   Diagnosis Date    Arthritis     Asthma     Cancer ([facility] [address] dx 2010    partial removal [facility] avalos ( no chemo or radiation  in remission)    Fibromyalgia     Headache     migraines    Low back pain     Seizure ([facility] [address]     noneptilectic  stress related  none since 2017  on no meds       Past Surgical History:   Procedure Laterality Date    ANESTHESIA NERVE BLOCK Right 06/29/2020    RIGHT L2 AND L4 TRANSFORAMINAL EPIDURAL STEROID INJECTION (CPT 79130,99859) performed by Alison Perry MD at [address] [facility] LIVER RESECTION      NERVE BLOCK Right 06/29/2020    transforaminal L2 and L4    NERVE BLOCK Bilateral 01/04/2021    BILATERAL LUMBAR FACET MEDIAL BRANCH BLOCK L3,4,5 WITH IV SEDATION (CPT [id number]) performed by Alison Perry MD at [facility] Right 2/15/2021    RIGHT L2, L4 TRANSFORAMINAL EPIDURAL STEROID INJECTION WITH SEDATION  (CPT 49669) performed by Alison Perry MD at [address] [place] [address]       Prior to Admission medications    Medication Sig Start Date End Date Taking? Authorizing Provider   DULoxetine (CYMBALTA) 30 MG extended release capsule Take 30 mg by mouth daily   Yes Historical Provider, MD   gabapentin (NEURONTIN) 400 MG capsule Take 400 mg by mouth 2 times daily.    Yes Historical Provider, MD   methocarbamol (ROBAXIN) 750 MG tablet Take 750 mg by mouth 2 times daily    Historical Provider, MD   ALBUTEROL IN Inhale into the lungs as needed    Historical Provider, MD   acetaminophen (TYLENOL) 325 MG tablet Take 650 mg by mouth every 6 hours as needed for Pain    Historical Provider, MD       Allergies   Allergen Reactions    Aspirin Shortness Of Breath     States lip swelling, trouble breathing      Codeine Shortness Of Breath     States also passes out.     Synthroid [Levothyroxine Sodium] Swelling       Social History     Socioeconomic History    Marital status:      Spouse name: Not on file    Number of children: Not on file    Years of education: Not on file    Highest education level: Not on file   Occupational History    Not on file   Social Needs    Financial resource strain: Not on file    Food insecurity     Worry: Not on file     Inability: Not on file    Transportation needs     Medical: Not on file     Non-medical: Not on file   Tobacco Use    Smoking status: Never Smoker    Smokeless tobacco: Never Used   Substance and Sexual Activity    Alcohol use: No    Drug use: No    Sexual activity: Yes     Partners: Male   Lifestyle    Physical activity     Days per week: Not on file     Minutes per session: Not on file    Stress: Not on file   Relationships    Social connections     Talks on phone: Not on file     Gets together: Not on file     Attends Spiritism service: Not on file     Active member of club or organization: Not on file     Attends meetings of clubs or organizations: Not on file     Relationship status: Not on file    Intimate partner violence     Fear of current or ex partner: Not on file     Emotionally abused: Not on file     Physically abused: Not on file     Forced sexual activity: Not on file   Other Topics Concern    Not on file   Social History Narrative    Not on file       Family History   Problem Relation Age of Onset    Heart Failure Mother     Cancer Brother     Arthritis Brother     Coronary Art Dis Brother     Diabetes Sister     Hypertension Sister     Arthritis Sister          REVIEW OF SYSTEMS:    CONSTITUTIONAL:  negative for  fevers, chills, sweats and fatigue    RESPIRATORY:  negative for  dry cough, cough with sputum, dyspnea, wheezing and chest pain    CARDIOVASCULAR:  negative for chest pain, dyspnea, palpitations, syncope    GASTROINTESTINAL:  negative for nausea, vomiting, change in bowel habits, diarrhea, constipation and abdominal pain    MUSCULOSKELETAL: negative for muscle weakness    SKIN: negative for itching or rashes. BEHAVIOR/PSYCH:  negative for poor appetite, increased appetite, decreased sleep and poor concentration    All other systems negative      PHYSICAL EXAM:    VITALS:  BP (!) 140/69   Pulse 64   Temp 96.6 °F (35.9 °C) (Temporal)   Resp 16   Ht 5' 6\" (1.676 m)   Wt 248 lb (112.5 kg)   SpO2 95%   BMI 40.03 kg/m²     CONSTITUTIONAL:  awake, alert, cooperative, no apparent distress, and appears stated age    EYES: PERRLA, EOMI    LUNGS:  No increased work of breathing, no audible wheezing    CARDIOVASCULAR:  regular rate and rhythm    ABDOMEN:  Soft non tender non distended     EXTREMITIES: no signs of clubbing or cyanosis. MUSCULOSKELETAL: negative for flaccid muscle tone or spastic movements. SKIN: gross examination reveals no signs of rashes, or diaphoresis. NEURO: Cranial nerves II-XII grossly intact. No signs of agitated mood.        Assessment/Plan:    Low back and right lower extremity pain for right L3 and L4 TFESI

## 2021-03-22 NOTE — ANESTHESIA POSTPROCEDURE EVALUATION
Department of Anesthesiology  Postprocedure Note    Patient: Babita Ku  MRN: [id number]  YOB: 1970  Date of evaluation: 3/22/2021  Time:  11:05 AM     Procedure Summary     Date: 03/22/21 Room / Location: [address] [place] [address] 04 / [address] [facility]    Anesthesia Start: 1009 Anesthesia Stop: 1027    Procedure: RIGHT L4-5 TRANSFORAMINAL EPIDURAL STEROID INJECTION WITH SEDATION  (CPT 78781) (Right ) Diagnosis: (LUMBAR RADICULOPATHY)    Surgeons: Lukasz Lopez MD Responsible Provider: Laz Hawkins MD    Anesthesia Type: MAC ASA Status: 2          Anesthesia Type: MAC    Alphonse Phase I: Alphonse Score: 10    Alphonse Phase II: Alphonse Score: 10    Last vitals: Reviewed and per EMR flowsheets.        Anesthesia Post Evaluation    Patient location during evaluation: PACU  Patient participation: complete - patient participated  Level of consciousness: awake and alert  Airway patency: patent  Nausea & Vomiting: no nausea and no vomiting  Complications: no  Cardiovascular status: hemodynamically stable  Respiratory status: room air and spontaneous ventilation  Hydration status: stable

## 2021-03-22 NOTE — OP NOTE
3/22/2021    Patient: Alinda Romberg  :  1970  Age:  53 y.o. Sex:  female     PRE-OPERATIVE DIAGNOSIS: Lumbar disc displacement, lumbar neural foraminal stenosis, lumbar radiculopathy. POST-OPERATIVE DIAGNOSIS: Same. PROCEDURE: Right Transforaminal epidural steroid injection under fluoroscopic guidance at foraminal level L3 and L4 (#1). SURGEON: MINDY Heath M.D. ANESTHESIA: MAC    ESTIMATED BLOOD LOSS: None.  ______________________________________________________________________  BRIEF HISTORY: Alinda Romberg comes in today for the first Right transforaminal epidural steroid injection under fluoroscopic guidance at foraminal level L3 and L4 . The potential complications of this procedure were discussed with her again today. She has elected to undergo the aforementioned procedure. Stella complete History & Physical examination were reviewed in depth, a copy of which is in the chart. DESCRIPTION OF PROCEDURE:    After confirming written and informed consent, a time-out was performed and Stella name and date of birth, the procedure to be performed as well as the plan for the location of the needle insertion were confirmed. The patient was brought into the procedure room and placed in the prone position on the fluoroscopy table. Standard monitors were placed and vital signs were observed throughout the procedure. The area of the lumbar spine was prepped with chloraprep and draped in a sterile manner. The vertebral body was identified with AP fluoroscopy. An oblique view was obtained to better visualize the inferior junction of the pedicle and transverse process . The 6 o'clock position of the pedicle was marked and identified. The skin and subcutaneous tissue were anesthetized with 0.5% lidocaine. A # 22 gauge pencil point needle was directed toward the targeted point under fluoroscopy until bone was contacted.  The needle was then walked inferiorly until the neural foramen was entered . A lateral fluoroscopic view was then used to place the needle tip in the middle of the foramen. Negative aspiration was confirmed for blood and CSF and 0.5 cc of Omnipaque 240 contrast was injected at each level under live fluoroscopy. Appropriate neurograms were observed under AP fluoroscopy. Then after negative aspiration, a solution of the 2 cc of 0.5% lidocaine and 40 mg DepoMedrol was easily injected at each level. The needles were removed with constant aspiration technique. The patient back was cleaned and a bandage was placed over the needle insertion points    Disposition the patient tolerated the procedure well and there were no complications . Vital signs remained stable throughout the procedure. The patient was escorted to the recovery area where they remained until discharge and written discharge instructions for the procedure were given. Plan: Minnie Dee will return to our pain management center as scheduled.      Airam Ann MD

## 2021-03-23 NOTE — FLOWSHEET NOTE
Instructed pt to contact dr. Michael Henry. Complaining of increased pain.  Leg is \"asleep and burning\"

## 2021-04-08 NOTE — PROGRESS NOTES
[facility]  [address] [facility], [address] [place] [address] Felice  [phone]  Telephone follow up visit      Date of Visit:  4/9/2021    CC:       HPI:  Patient today s/p LESI and reports injection made pain worse and now experiencing slight edema bilateral legs and cramping. Pt notes low back pain with intermittent radicular sx to right hip down lateral leg to knee. Pt notes current regimen still helps take the edge off her pain. Pt reports axial low back pain is main complaint. LBP is >70% of her pain    Appropriate analgesia with current medications regimen: somewhat  Change in quality of symptoms: improved somewhat  Medication side effects: none  Recent diagnostic testing: none  Recent interventional procedures: 03/22/21: S/p Right L4-L5 TRANSFORAMINAL EPIDURAL STEROID INJECTION with no relief     She has not been on anticoagulation medications to include none. The patient [place] not been on herbal supplements.  The patient is not diabetic.     Imaging:      Thoracic Xray 09/16/2020  There are    multilevel degenerative changes      Impression    No acute abnormality of the thoracic spine            Lumbar spine MRI 02/2020  Degenerative changes of the lumbar spine as discussed level by level in the body of the report.  No significant change since prior examination.  Findings are most pronounced at L4-5 with mild to moderate left and mild right neural foraminal stenosis, unchanged.  Right far lateral disc extrusion at L2-3 abutting exiting nerve roots, unchanged.  No significant spinal canal narrowing. Right lower extremity EMG:  This study was Abnormal.    1. There is electrodiagnostic evidence of right lumbar motor radiculopathy, as indicated by abnormal right lumbar paraspinal testing. This is not further defined, however, as all tested right lower extremity muscles are normal.      2.  There is no electrodiagnostic evidence of any other peripheral nerve mononeuropathy, plexopathy, or peripheral polyneuropathy in the bilateral lower extremities. Cannot evaluate for left lumbar radiculopathy without completion of needle exam of the left lower extremity. Cannot evaluate for pure sensory radiculopathy or small fiber neuropathy by electrodiagnostic technique.      Previous treatments: medications. , ztlido itching, dizziness, lethargy with tizanidine                                        Potential Aberrant Drug-Related Behavior:   None      Urine Drug Screening:  None      OARRS report:  04/2021 consistent    Past Medical History: Reviewed    Past Surgical History: Reviewed     Home Medications: Reviewed    Allergies: Reviewed     Social History: Reviewed     REVIEW OF SYSTEMS:     Woo Beasley denies fever/chills, chest pain, shortness of breath, new bowel or bladder complaints. All other review of systems was negative. PHYSICAL EXAMINATION:    General:       General appearance: tearful, pleasant and well-hydrated. , in moderate to severe discomfort and A & O x3  Build: Morbidly obese     HEENT:     Head:normocephalic and atraumatic  Sclera: icterus present,      Lungs:     Breathing:Breathing Pattern: regular, no distress     Abdomen:     Shape:non-distended and normal  Tenderness:none     Thoracic spine:     Spine inspection:normal   pationPal:tenderness paravertebral muscles, midline tenderness, facet loading, left, right and negative. Range of motion:abnormal moderately flexion, extension rotation bilateral and is mildly painful.     Lumbar spine:     Spine inspection:normal, exaggerated kyphosis, scoliosis, lordosis and surgical incision scar   CVA tenderness:No   Palpationright facet tenderness   Range of motion:abnormal moderately Lateral bending, flexion, extension rotation right and is moderately painful. Facet loading positive right and left     Musculoskeletal:     Trigger points in Paraveteral:present right side  SI joint tenderness:negative right, negative left.   SLR: negative right, negative left, sitting      Extremities:     Tremors:None bilaterally upper and lower  Range of motion, pain with internal rotation of hips negative. Intact:Yes  Edema: generalized bilateral LE      Neurological:     Sensory:diminished to light touch lateral aspect of right leg and right calf  Motor:                          Right Quadriceps3/5          Left Quadriceps5/5           Right Gastrocnemius3/5    Left Gastrocnemius5/5  Right Ant Tibialis3/5  Left Ant Tibialis5/5     Gait:normal     Dermatology:     Skin:no unusual rashes and no skin lesions         Impression:  Chronic low back pain with radiation to the right lower extremity with numbness in the right leg. Lumbar spine MRI 2020 L2-3 right lateral disc extrusion and L4-5 disc bulge with facet hypertrophy and foraminal stenosis. Plan:  Followed for lbp back pain with intermittent right lateral leg radicular sx to knee with some weakness and cramping  Schedule for repeat Bilatearal LMBB #2 L345 with sedation   1/4/21:Bilateral Lumbar facet medial branch block L345 with >70% relief but side effects headache and n/t foot   S/p Right L4-L5 TRANSFORAMINAL EPIDURAL STEROID INJECTION made pain worse  Increase Gabapentin 400mg po tid   Continue Duloxetine 30mg daily   Continue Robaxin 750mg po bid with 2 refills  NSAIDS contraindicated due to allergy to ASA  Continue with OTC pain medications as tolerated  OARRS report reviewed 04/2021. Patient encouraged to stay active and to lose weight. Failed physical therapy. Treatment plan discussed with the patient including medications side effects.     We discussed with the patient that combining opioids, benzodiazepines, alcohol, illicit drugs or sleep aids increases the risk of respiratory depression including death. We discussed that these medications may cause drowsiness, sedation or dizziness and have counseled the patient not to drive or operate machinery.  We have discussed that these medications will be prescribed only by one provider. We have discussed with the patient about age related risk factors and have thoroughly discussed the importance of taking these medications as prescribed.  The patient verbalizes understanding.

## 2021-04-09 ENCOUNTER — OFFICE VISIT (OUTPATIENT)
Dept: PAIN MANAGEMENT | Age: 51
End: 2021-04-09
Payer: COMMERCIAL

## 2021-04-09 ENCOUNTER — PREP FOR PROCEDURE (OUTPATIENT)
Dept: PAIN MANAGEMENT | Age: 51
End: 2021-04-09

## 2021-04-09 VITALS
RESPIRATION RATE: 16 BRPM | WEIGHT: 235 LBS | BODY MASS INDEX: 37.77 KG/M2 | HEART RATE: 68 BPM | OXYGEN SATURATION: 94 % | HEIGHT: 66 IN | TEMPERATURE: 97.5 F | SYSTOLIC BLOOD PRESSURE: 140 MMHG | DIASTOLIC BLOOD PRESSURE: 80 MMHG

## 2021-04-09 DIAGNOSIS — M54.16 LUMBAR RADICULOPATHY: ICD-10-CM

## 2021-04-09 DIAGNOSIS — G89.4 CHRONIC PAIN SYNDROME: ICD-10-CM

## 2021-04-09 DIAGNOSIS — M47.816 LUMBAR FACET ARTHROPATHY: Primary | ICD-10-CM

## 2021-04-09 DIAGNOSIS — M51.9 LUMBAR DISC DISORDER: ICD-10-CM

## 2021-04-09 DIAGNOSIS — M54.6 PAIN IN THORACIC SPINE: ICD-10-CM

## 2021-04-09 DIAGNOSIS — M43.06 LUMBAR SPONDYLOLYSIS: ICD-10-CM

## 2021-04-09 DIAGNOSIS — M79.604 PAIN IN RIGHT LEG: ICD-10-CM

## 2021-04-09 PROCEDURE — 99213 OFFICE O/P EST LOW 20 MIN: CPT | Performed by: NURSE PRACTITIONER

## 2021-04-09 PROCEDURE — 1036F TOBACCO NON-USER: CPT | Performed by: NURSE PRACTITIONER

## 2021-04-09 PROCEDURE — G8417 CALC BMI ABV UP PARAM F/U: HCPCS | Performed by: NURSE PRACTITIONER

## 2021-04-09 PROCEDURE — G8427 DOCREV CUR MEDS BY ELIG CLIN: HCPCS | Performed by: NURSE PRACTITIONER

## 2021-04-09 RX ORDER — DULOXETIN HYDROCHLORIDE 30 MG/1
30 CAPSULE, DELAYED RELEASE ORAL DAILY
Qty: 30 CAPSULE | Refills: 2 | Status: SHIPPED
Start: 2021-04-09 | End: 2021-06-14 | Stop reason: SDUPTHER

## 2021-04-09 RX ORDER — GABAPENTIN 400 MG/1
400 CAPSULE ORAL 3 TIMES DAILY
Qty: 90 CAPSULE | Refills: 2 | Status: SHIPPED | COMMUNITY
Start: 2021-04-09 | End: 2021-04-22 | Stop reason: SDUPTHER

## 2021-04-09 RX ORDER — METHOCARBAMOL 750 MG/1
750 TABLET, FILM COATED ORAL 2 TIMES DAILY
Qty: 60 TABLET | Refills: 0 | Status: SHIPPED
Start: 2021-04-09 | End: 2021-05-26

## 2021-04-09 NOTE — PROGRESS NOTES
Do you currently have any of the following:    Fever: No  Headache:  No  Cough: No  Shortness of breath: No  Exposed to anyone with these symptoms: No                                                                                                                Aisha Mendez presents to the [facility] on 4/9/2021. Jesus Jones is complaining of pain mid back down both legs to toes. The pain is constant. The pain is described as stabbing, tender, burning and numb. Pain is rated on her best day at a 5, on her worst day at a 10, and on average at a 6 on the VAS scale. She took her last dose of cymbalta, robaxin and gabapentin today, tylenol as needed. Jesus Jones does not have issues with constipation. Any procedures since your last visit: No, with  % relief. She is not on NSAIDS and  is not on anticoagulation medications to include none and is managed by . Pacemaker or defibrillator: No Physician managing device is . Medication Contract and Consent for Opioid Use Documents Filed     Patient Documents       Type of Document Status Date Received Received By Description     Medication Contract Received 10/20/2020  9:17 AM FRANCY WILEY pain management pt agreement                   BP (!) 140/80   Pulse 68   Temp 97.5 °F (36.4 °C) (Infrared)   Resp 16   Ht 5' 6\" (1.676 m)   Wt 235 lb (106.6 kg)   SpO2 94%   BMI 37.93 kg/m²      No LMP recorded. Patient has had a hysterectomy.

## 2021-04-22 ENCOUNTER — TELEPHONE (OUTPATIENT)
Dept: PAIN MANAGEMENT | Age: 51
End: 2021-04-22

## 2021-04-22 RX ORDER — GABAPENTIN 400 MG/1
400 CAPSULE ORAL 3 TIMES DAILY
Qty: 90 CAPSULE | Refills: 1 | OUTPATIENT
Start: 2021-04-22 | End: 2021-06-14 | Stop reason: SDUPTHER

## 2021-04-22 NOTE — TELEPHONE ENCOUNTER
Emi Denton picked up her Gabapentin and it said BID, but script we sent on 4-9 should have been TID. I called the pharmacy and phoned in a new script stating 400mg TID and Emi Denton is aware.

## 2021-05-04 ENCOUNTER — HOSPITAL ENCOUNTER (OUTPATIENT)
Age: 51
Discharge: HOME | End: 2021-05-06
Payer: COMMERCIAL

## 2021-05-04 DIAGNOSIS — M47.816 LUMBAR FACET ARTHROPATHY: ICD-10-CM

## 2021-05-04 PROCEDURE — U0003 INFECTIOUS AGENT DETECTION BY NUCLEIC ACID (DNA OR RNA); SEVERE ACUTE RESPIRATORY SYNDROME CORONAVIRUS 2 (SARS-COV-2) (CORONAVIRUS DISEASE [COVID-19]), AMPLIFIED PROBE TECHNIQUE, MAKING USE OF HIGH THROUGHPUT TECHNOLOGIES AS DESCRIBED BY CMS-2020-01-R: HCPCS

## 2021-05-04 PROCEDURE — U0005 INFEC AGEN DETEC AMPLI PROBE: HCPCS

## 2021-05-05 LAB
SARS-COV-2: NOT DETECTED
SOURCE: NORMAL

## 2021-05-10 ENCOUNTER — TELEPHONE (OUTPATIENT)
Dept: PAIN MANAGEMENT | Age: 51
End: 2021-05-10

## 2021-05-26 RX ORDER — METHOCARBAMOL 750 MG/1
TABLET, FILM COATED ORAL
Qty: 60 TABLET | Refills: 0 | Status: SHIPPED
Start: 2021-05-26 | End: 2021-06-14 | Stop reason: SDUPTHER

## 2021-06-14 ENCOUNTER — OFFICE VISIT (OUTPATIENT)
Dept: PAIN MANAGEMENT | Age: 51
End: 2021-06-14
Payer: COMMERCIAL

## 2021-06-14 VITALS
DIASTOLIC BLOOD PRESSURE: 76 MMHG | HEIGHT: 66 IN | OXYGEN SATURATION: 98 % | WEIGHT: 235 LBS | SYSTOLIC BLOOD PRESSURE: 120 MMHG | BODY MASS INDEX: 37.77 KG/M2 | HEART RATE: 78 BPM | TEMPERATURE: 98.2 F | RESPIRATION RATE: 16 BRPM

## 2021-06-14 DIAGNOSIS — M54.6 PAIN IN THORACIC SPINE: ICD-10-CM

## 2021-06-14 DIAGNOSIS — M51.9 LUMBAR DISC DISORDER: ICD-10-CM

## 2021-06-14 DIAGNOSIS — M47.816 LUMBAR FACET ARTHROPATHY: Primary | ICD-10-CM

## 2021-06-14 DIAGNOSIS — G89.4 CHRONIC PAIN SYNDROME: ICD-10-CM

## 2021-06-14 DIAGNOSIS — M54.16 LUMBAR RADICULOPATHY: ICD-10-CM

## 2021-06-14 DIAGNOSIS — M43.06 LUMBAR SPONDYLOLYSIS: ICD-10-CM

## 2021-06-14 PROCEDURE — 99213 OFFICE O/P EST LOW 20 MIN: CPT | Performed by: NURSE PRACTITIONER

## 2021-06-14 PROCEDURE — 1036F TOBACCO NON-USER: CPT | Performed by: NURSE PRACTITIONER

## 2021-06-14 PROCEDURE — G8417 CALC BMI ABV UP PARAM F/U: HCPCS | Performed by: NURSE PRACTITIONER

## 2021-06-14 PROCEDURE — G8427 DOCREV CUR MEDS BY ELIG CLIN: HCPCS | Performed by: NURSE PRACTITIONER

## 2021-06-14 PROCEDURE — 3017F COLORECTAL CA SCREEN DOC REV: CPT | Performed by: NURSE PRACTITIONER

## 2021-06-14 RX ORDER — GABAPENTIN 400 MG/1
400 CAPSULE ORAL 3 TIMES DAILY
Qty: 90 CAPSULE | Refills: 1 | Status: SHIPPED
Start: 2021-06-14 | End: 2021-08-03 | Stop reason: ALTCHOICE

## 2021-06-14 RX ORDER — METHOCARBAMOL 750 MG/1
750 TABLET, FILM COATED ORAL 2 TIMES DAILY PRN
Qty: 60 TABLET | Refills: 0 | Status: SHIPPED | OUTPATIENT
Start: 2021-06-14 | End: 2021-07-14

## 2021-06-14 RX ORDER — DULOXETIN HYDROCHLORIDE 30 MG/1
30 CAPSULE, DELAYED RELEASE ORAL DAILY
Qty: 30 CAPSULE | Refills: 2 | Status: SHIPPED
Start: 2021-06-14 | End: 2021-08-03 | Stop reason: ALTCHOICE

## 2021-06-14 RX ORDER — OXYCODONE HYDROCHLORIDE 5 MG/1
CAPSULE ORAL
COMMUNITY
Start: 2021-06-08 | End: 2021-06-14 | Stop reason: SINTOL

## 2021-06-14 RX ORDER — IBUPROFEN 600 MG/1
TABLET ORAL
COMMUNITY
Start: 2021-06-08 | End: 2021-06-29 | Stop reason: ALTCHOICE

## 2021-06-14 NOTE — PROGRESS NOTES
Do you currently have any of the following:    Fever: No  Headache:  No  Cough: No  Shortness of breath: No  Exposed to anyone with these symptoms: No                                                                                                                Hettie Belt presents to the [facility] on 6/14/2021. Laura Pappas is complaining of pain mid back to tailbone. The pain is constant. The pain is described as shooting and burning. Pain is rated on her best day at a 5, on her worst day at a 10, and on average at a 6 on the VAS scale. She took her last dose of Tylenol week ago. Laura Pappas does not have issues with constipation. Any procedures since your last visit: No, with    % relief. She is not on NSAIDS and  is not on anticoagulation medications to include none and is managed by . Pacemaker or defibrillator: No Physician managing device is no    Medication Contract and Consent for Opioid Use Documents Filed     Patient Documents       Type of Document Status Date Received Received By Description     Medication Contract Received 10/20/2020  9:17 AM FRANCY WILEY pain management pt agreement                   /76   Pulse 78   Temp 98.2 °F (36.8 °C) (Infrared)   Resp 16   Ht 5' 6\" (1.676 m)   Wt 235 lb (106.6 kg)   SpO2 98%   BMI 37.93 kg/m²      No LMP recorded. Patient has had a hysterectomy.
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
## 2021-07-01 ENCOUNTER — HOSPITAL ENCOUNTER (OUTPATIENT)
Age: 51
Discharge: HOME | End: 2021-07-03
Payer: COMMERCIAL

## 2021-07-01 DIAGNOSIS — M43.06 LUMBAR SPONDYLOLYSIS: ICD-10-CM

## 2021-07-01 PROCEDURE — U0003 INFECTIOUS AGENT DETECTION BY NUCLEIC ACID (DNA OR RNA); SEVERE ACUTE RESPIRATORY SYNDROME CORONAVIRUS 2 (SARS-COV-2) (CORONAVIRUS DISEASE [COVID-19]), AMPLIFIED PROBE TECHNIQUE, MAKING USE OF HIGH THROUGHPUT TECHNOLOGIES AS DESCRIBED BY CMS-2020-01-R: HCPCS

## 2021-07-01 PROCEDURE — U0005 INFEC AGEN DETEC AMPLI PROBE: HCPCS

## 2021-07-02 LAB — SARS-COV-2, PCR: NOT DETECTED

## 2021-07-06 ENCOUNTER — ANESTHESIA EVENT (OUTPATIENT)
Dept: OPERATING ROOM | Age: 51
End: 2021-07-06
Payer: COMMERCIAL

## 2021-07-06 ASSESSMENT — LIFESTYLE VARIABLES: SMOKING_STATUS: 0

## 2021-07-06 NOTE — ANESTHESIA PRE PROCEDURE
Department of Anesthesiology  Preprocedure Note       Name:  Elissa Esquivel   Age:  49 y.o.  :  1970                                          MRN:  [id number]         Date:  2021      Surgeon: Massiel Del Valle):  Ilya Angel MD    Procedure: Procedure(s):  LUMBAR MEDIAL BRANCH BLOCK BILATERAL L3, L4, L5 WITH SEDATION (CPT 72808)    Medications prior to admission:   Prior to Admission medications    Medication Sig Start Date End Date Taking? Authorizing Provider   gabapentin (NEURONTIN) 400 MG capsule Take 1 capsule by mouth 3 times daily for 30 days. 21  SCAR Garcia CNP   DULoxetine (CYMBALTA) 30 MG extended release capsule Take 1 capsule by mouth daily 21  SCAR Garcia CNP   methocarbamol (ROBAXIN) 750 MG tablet Take 1 tablet by mouth 2 times daily as needed (spasms) 21  SCAR Garcia CNP   ALBUTEROL IN Inhale into the lungs as needed    Historical Provider, MD   acetaminophen (TYLENOL) 325 MG tablet Take 650 mg by mouth every 6 hours as needed for Pain    Historical Provider, MD       Current medications:    Current Outpatient Medications   Medication Sig Dispense Refill    gabapentin (NEURONTIN) 400 MG capsule Take 1 capsule by mouth 3 times daily for 30 days. 90 capsule 1    DULoxetine (CYMBALTA) 30 MG extended release capsule Take 1 capsule by mouth daily 30 capsule 2    methocarbamol (ROBAXIN) 750 MG tablet Take 1 tablet by mouth 2 times daily as needed (spasms) 60 tablet 0    ALBUTEROL IN Inhale into the lungs as needed      acetaminophen (TYLENOL) 325 MG tablet Take 650 mg by mouth every 6 hours as needed for Pain       No current facility-administered medications for this visit. Allergies: Allergies   Allergen Reactions    Aspirin Shortness Of Breath     States lip swelling, trouble breathing      Codeine Shortness Of Breath     States also passes out.     Synthroid [Levothyroxine Sodium] Swelling       Problem List:    Patient Active Problem List   Diagnosis Code    Chronic pain syndrome G89.4    Lumbar disc disorder M51.9    Lumbar facet arthropathy M47.816    Lumbar radiculopathy M54.16    Pain in right leg M79.604    Pain in thoracic spine M54.6    Lumbar spondylolysis M43.06       Past Medical History:        Diagnosis Date    Arthritis     Asthma     Cancer ([facility] [address] dx 2010    partial removal [facility] ( no chemo or radiation  in remission)    Fibromyalgia     Headache     migraines    Low back pain     Seizure ([facility] [address]     noneptilectic  stress related  none since 2017  on no meds       Past Surgical History:        Procedure Laterality Date    ANESTHESIA NERVE BLOCK Right 06/29/2020    RIGHT L2 AND L4 TRANSFORAMINAL EPIDURAL STEROID INJECTION (CPT 06351,35498) performed by Irene Prince MD at [address] [place] [address]      LIVER RESECTION      NERVE BLOCK Bilateral 01/04/2021    BILATERAL LUMBAR FACET MEDIAL BRANCH BLOCK L3,4,5 WITH IV SEDATION (CPT 34620) [facility] 02/15/2021    RIGHT L2, L4 TRANSFORAMINAL EPIDURAL STEROID INJECTION WITH SEDATION  (CPT 26973) [facility] Right 03/22/2021    RIGHT L4-5 TRANSFORAMINAL EPIDURAL STEROID INJECTION WITH SEDATION  (CPT 96422) performed by Irene Prince MD at [address] [place] [address] History:    Social History     Tobacco Use    Smoking status: Never Smoker    Smokeless tobacco: Never Used   Substance Use Topics    Alcohol use: No                                Counseling given: Not Answered      Vital Signs (Current): There were no vitals filed for this visit.                                            BP Readings from Last 3 Encounters:   06/14/21 120/76   04/09/21 (!) 140/80   03/22/21 134/83       NPO Status:  >8.H BMI:   Wt Readings from Last 3 Encounters:   06/14/21 235 lb (106.6 kg)   04/09/21 235 lb (106.6 kg)   03/22/21 248 lb (112.5 kg)     There is no height or weight on file to calculate BMI.    CBC:   Lab Results   Component Value Date    WBC 9.5 10/17/2016    RBC 4.99 10/17/2016    HGB 14.3 10/17/2016    HCT 41.8 10/17/2016    MCV 83.7 10/17/2016    RDW 12.8 10/17/2016     10/17/2016       CMP:   Lab Results   Component Value Date     10/17/2016    K 3.7 10/17/2016     10/17/2016    CO2 19 10/17/2016    BUN 16 10/17/2016    CREATININE 1.0 10/17/2016    GFRAA >60 10/17/2016    LABGLOM 60 10/17/2016    GLUCOSE 126 10/17/2016    CALCIUM 10.0 10/17/2016       POC Tests: No results for input(s): POCGLU, POCNA, POCK, POCCL, POCBUN, POCHEMO, POCHCT in the last 72 hours. Coags: No results found for: PROTIME, INR, APTT    HCG (If Applicable): No results found for: PREGTESTUR, PREGSERUM, HCG, HCGQUANT     ABGs: No results found for: PHART, PO2ART, XXW4KNI, ZRW2ZUH, BEART, N4VEQHGF     Type & Screen (If Applicable):  No results found for: LABABO, LABRH    Drug/Infectious Status (If Applicable):  No results found for: HIV, HEPCAB    COVID-19 Screening (If Applicable):   Lab Results   Component Value Date    COVID19 Not Detected 07/01/2021           Anesthesia Evaluation  Patient summary reviewed  Airway: Mallampati: II  TM distance: >3 FB   Neck ROM: full  Comment: Fat neck  Mouth opening: > = 3 FB Dental: normal exam         Pulmonary: breath sounds clear to auscultation  (+) asthma:     (-) not a current smoker                           Cardiovascular:  Exercise tolerance: good (>4 METS),           Rhythm: regular  Rate: normal                    Neuro/Psych:   (+) neuromuscular disease:, headaches:,             GI/Hepatic/Renal:   (+) morbid obesity          Endo/Other:                     Abdominal:   (+) obese,           Vascular:           Other Findings:               Anesthesia Plan      MAC     ASA 3       Induction: intravenous. Anesthetic plan and risks discussed with patient. Plan discussed with CRNA.                 Antonio Nunez MD   7/6/2021

## 2021-07-08 ENCOUNTER — HOSPITAL ENCOUNTER (OUTPATIENT)
Age: 51
Setting detail: OUTPATIENT SURGERY
Discharge: HOME | End: 2021-07-08
Attending: PAIN MEDICINE | Admitting: PAIN MEDICINE
Payer: COMMERCIAL

## 2021-07-08 ENCOUNTER — HOSPITAL ENCOUNTER (OUTPATIENT)
Dept: OPERATING ROOM | Age: 51
Setting detail: OUTPATIENT SURGERY
Discharge: HOME | End: 2021-07-08
Attending: PAIN MEDICINE
Payer: COMMERCIAL

## 2021-07-08 ENCOUNTER — ANESTHESIA (OUTPATIENT)
Dept: OPERATING ROOM | Age: 51
End: 2021-07-08
Payer: COMMERCIAL

## 2021-07-08 VITALS
RESPIRATION RATE: 16 BRPM | OXYGEN SATURATION: 96 % | WEIGHT: 257 LBS | TEMPERATURE: 97.6 F | SYSTOLIC BLOOD PRESSURE: 126 MMHG | BODY MASS INDEX: 41.3 KG/M2 | HEIGHT: 66 IN | DIASTOLIC BLOOD PRESSURE: 50 MMHG | HEART RATE: 72 BPM

## 2021-07-08 VITALS
TEMPERATURE: 98.6 F | DIASTOLIC BLOOD PRESSURE: 71 MMHG | OXYGEN SATURATION: 99 % | RESPIRATION RATE: 15 BRPM | SYSTOLIC BLOOD PRESSURE: 129 MMHG

## 2021-07-08 DIAGNOSIS — M43.06 LUMBAR SPONDYLOLYSIS: Primary | ICD-10-CM

## 2021-07-08 DIAGNOSIS — M47.896 OTHER OSTEOARTHRITIS OF SPINE, LUMBAR REGION: ICD-10-CM

## 2021-07-08 PROCEDURE — 2709999900 HC NON-CHARGEABLE SUPPLY: Performed by: PAIN MEDICINE

## 2021-07-08 PROCEDURE — 64494 INJ PARAVERT F JNT L/S 2 LEV: CPT | Performed by: PAIN MEDICINE

## 2021-07-08 PROCEDURE — 7100000010 HC PHASE II RECOVERY - FIRST 15 MIN: Performed by: PAIN MEDICINE

## 2021-07-08 PROCEDURE — 64493 INJ PARAVERT F JNT L/S 1 LEV: CPT | Performed by: PAIN MEDICINE

## 2021-07-08 PROCEDURE — 6360000002 HC RX W HCPCS: Performed by: NURSE ANESTHETIST, CERTIFIED REGISTERED

## 2021-07-08 PROCEDURE — 3600000005 HC SURGERY LEVEL 5 BASE: Performed by: PAIN MEDICINE

## 2021-07-08 PROCEDURE — 3700000000 HC ANESTHESIA ATTENDED CARE: Performed by: PAIN MEDICINE

## 2021-07-08 PROCEDURE — 3209999900 FLUORO FOR SURGICAL PROCEDURES

## 2021-07-08 PROCEDURE — 7100000011 HC PHASE II RECOVERY - ADDTL 15 MIN: Performed by: PAIN MEDICINE

## 2021-07-08 PROCEDURE — 6360000002 HC RX W HCPCS: Performed by: PAIN MEDICINE

## 2021-07-08 PROCEDURE — 2500000003 HC RX 250 WO HCPCS: Performed by: PAIN MEDICINE

## 2021-07-08 PROCEDURE — 2580000003 HC RX 258: Performed by: ANESTHESIOLOGY

## 2021-07-08 RX ORDER — MEPERIDINE HYDROCHLORIDE 25 MG/ML
12.5 INJECTION INTRAMUSCULAR; INTRAVENOUS; SUBCUTANEOUS EVERY 5 MIN PRN
Status: DISCONTINUED | OUTPATIENT
Start: 2021-07-08 | End: 2021-07-08 | Stop reason: HOSPADM

## 2021-07-08 RX ORDER — LABETALOL HYDROCHLORIDE 5 MG/ML
5 INJECTION, SOLUTION INTRAVENOUS EVERY 10 MIN PRN
Status: DISCONTINUED | OUTPATIENT
Start: 2021-07-08 | End: 2021-07-08 | Stop reason: HOSPADM

## 2021-07-08 RX ORDER — LIDOCAINE HYDROCHLORIDE 20 MG/ML
INJECTION, SOLUTION INTRAVENOUS PRN
Status: DISCONTINUED | OUTPATIENT
Start: 2021-07-08 | End: 2021-07-08 | Stop reason: SDUPTHER

## 2021-07-08 RX ORDER — PROMETHAZINE HYDROCHLORIDE 25 MG/ML
25 INJECTION, SOLUTION INTRAMUSCULAR; INTRAVENOUS
Status: DISCONTINUED | OUTPATIENT
Start: 2021-07-08 | End: 2021-07-08 | Stop reason: HOSPADM

## 2021-07-08 RX ORDER — PROPOFOL 10 MG/ML
INJECTION, EMULSION INTRAVENOUS PRN
Status: DISCONTINUED | OUTPATIENT
Start: 2021-07-08 | End: 2021-07-08 | Stop reason: SDUPTHER

## 2021-07-08 RX ORDER — SODIUM CHLORIDE, SODIUM LACTATE, POTASSIUM CHLORIDE, CALCIUM CHLORIDE 600; 310; 30; 20 MG/100ML; MG/100ML; MG/100ML; MG/100ML
INJECTION, SOLUTION INTRAVENOUS CONTINUOUS
Status: DISCONTINUED | OUTPATIENT
Start: 2021-07-08 | End: 2021-07-08 | Stop reason: HOSPADM

## 2021-07-08 RX ORDER — DIPHENHYDRAMINE HYDROCHLORIDE 50 MG/ML
12.5 INJECTION INTRAMUSCULAR; INTRAVENOUS
Status: DISCONTINUED | OUTPATIENT
Start: 2021-07-08 | End: 2021-07-08 | Stop reason: HOSPADM

## 2021-07-08 RX ORDER — FENTANYL CITRATE 50 UG/ML
INJECTION, SOLUTION INTRAMUSCULAR; INTRAVENOUS PRN
Status: DISCONTINUED | OUTPATIENT
Start: 2021-07-08 | End: 2021-07-08 | Stop reason: SDUPTHER

## 2021-07-08 RX ORDER — LIDOCAINE HYDROCHLORIDE 5 MG/ML
INJECTION, SOLUTION INFILTRATION; INTRAVENOUS PRN
Status: DISCONTINUED | OUTPATIENT
Start: 2021-07-08 | End: 2021-07-08 | Stop reason: ALTCHOICE

## 2021-07-08 RX ORDER — MIDAZOLAM HYDROCHLORIDE 1 MG/ML
INJECTION INTRAMUSCULAR; INTRAVENOUS PRN
Status: DISCONTINUED | OUTPATIENT
Start: 2021-07-08 | End: 2021-07-08 | Stop reason: SDUPTHER

## 2021-07-08 RX ORDER — HYDRALAZINE HYDROCHLORIDE 20 MG/ML
5 INJECTION INTRAMUSCULAR; INTRAVENOUS EVERY 10 MIN PRN
Status: DISCONTINUED | OUTPATIENT
Start: 2021-07-08 | End: 2021-07-08 | Stop reason: HOSPADM

## 2021-07-08 RX ADMIN — SODIUM CHLORIDE, POTASSIUM CHLORIDE, SODIUM LACTATE AND CALCIUM CHLORIDE: 600; 310; 30; 20 INJECTION, SOLUTION INTRAVENOUS at 12:05

## 2021-07-08 RX ADMIN — LIDOCAINE HYDROCHLORIDE 10 MG: 20 INJECTION, SOLUTION INTRAVENOUS at 12:20

## 2021-07-08 RX ADMIN — PROPOFOL 30 MG: 10 INJECTION, EMULSION INTRAVENOUS at 12:20

## 2021-07-08 RX ADMIN — MIDAZOLAM 2 MG: 1 INJECTION INTRAMUSCULAR; INTRAVENOUS at 12:17

## 2021-07-08 RX ADMIN — FENTANYL CITRATE 50 MCG: 50 INJECTION, SOLUTION INTRAMUSCULAR; INTRAVENOUS at 12:18

## 2021-07-08 RX ADMIN — FENTANYL CITRATE 50 MCG: 50 INJECTION, SOLUTION INTRAMUSCULAR; INTRAVENOUS at 12:20

## 2021-07-08 ASSESSMENT — PAIN DESCRIPTION - DESCRIPTORS: DESCRIPTORS: ACHING

## 2021-07-08 ASSESSMENT — PAIN SCALES - GENERAL
PAINLEVEL_OUTOF10: 1
PAINLEVEL_OUTOF10: 0
PAINLEVEL_OUTOF10: 0

## 2021-07-08 ASSESSMENT — PAIN - FUNCTIONAL ASSESSMENT: PAIN_FUNCTIONAL_ASSESSMENT: 0-10

## 2021-07-08 NOTE — ANESTHESIA POSTPROCEDURE EVALUATION
Department of Anesthesiology  Postprocedure Note    Patient: Serina Bowen  MRN: [id number]  YOB: 1970  Date of evaluation: 7/8/2021  Time:  1:36 PM     Procedure Summary     Date: 07/08/21 Room / Location: [address] [place] [address] 04 / [address] [facility]    Anesthesia Start: 7056 Anesthesia Stop: 7920    Procedure: LUMBAR MEDIAL BRANCH BLOCK BILATERAL L3, L4, L5 WITH SEDATION (CPT 59463) (Bilateral ) Diagnosis: (LUMBAR RADICULOPATHY)    Surgeons: Haydee Villarreal MD Responsible Provider: Adair Cheung MD    Anesthesia Type: MAC ASA Status: 3          Anesthesia Type: MAC    Alphonse Phase I: Alphonse Score: 10    Alphonse Phase II: Alphonse Score: 9    Last vitals: Reviewed and per EMR flowsheets.        Anesthesia Post Evaluation    Patient location during evaluation: PACU  Patient participation: complete - patient participated  Level of consciousness: awake and alert  Airway patency: patent  Nausea & Vomiting: no nausea and no vomiting  Complications: no  Cardiovascular status: hemodynamically stable  Respiratory status: room air and spontaneous ventilation  Hydration status: stable

## 2021-07-08 NOTE — OP NOTE
2021    Patient: Ladonna Mcarthur  :  1970  Age:  49 y.o. Sex:  female     PRE-OPERATIVE DIAGNOSIS: Bilateral Lumbar spondylosis, lumbar facet syndrome. POST-OPERATIVE DIAGNOSIS: Same. PROCEDURE:  # 1 Fluoroscopic guided lumbar medial branch blocks Bilateral at Levels: L3, L4, L5 MBB. SURGEON: MINDY Cheung M.D. ANESTHESIA: MAC    ESTIMATED BLOOD LOSS: None.  ______________________________________________________________________  BRIEF HISTORY:  Ladonna Mcarthur comes in today for 1 fluoroscopic guided lumbar medial branch blocks  Bilateral  at Levels: L3, L4, L5 MBB. The potential complications of this procedure were discussed with her again today. She has elected to undergo the aforementioned procedure. Stella complete History & Physical examination were reviewed in depth, a copy of which is in the chart. DESCRIPTION OF PROCEDURE:   After confirming written and informed consent, a time-out was performed and Stella name and date of birth, the procedure to be performed as well as the plan for the location of the needle insertion were confirmed. The patient was brought into the procedure room and placed in the prone position on the fluoroscopy table. Standard monitors were placed and vital signs were observed throughout the procedure. The area of the lumbar spine was prepped with chloraprep and draped in a sterile manner. AP fluoroscopy was used to identify and david bartons point at the targeted levels. The skin and subcutaneous tissues in these identified areas were anesthetized with 0.5%Lidocaine. A 22 # gauge 5 inch spinal needle was advanced toward the junction of the superior articular process and the transverse process, along the course of the medial branch. Satisfactory needle placement was confirmed by AP and oblique projections.   At the sacral alar level, the sacral alar region was visualized and the needle tip was positioned on the sacral alar at the base of the superior articulating process where the medial branch traverses under fluoroscopic guidance. Once bone was contacted and negative aspiration was confirmed. A solution of 0.25% marcaine with 40 DepoMedrol 3 cc was then injected and distributed equally at each level. Following the procedure Home Mata noted improvement of previous pain symptoms. Disposition the patient tolerated the procedure well and there were no complications . Vital signs remained stable throughout the procedure. The patient was escorted to the recovery area where they remained until discharge and written discharge instructions for the procedure were given. Plan: Home Mata will return to our pain management center as scheduled.      Roxanne Francisco MD

## 2021-07-08 NOTE — H&P
[address]  [address] [facility], [address] [place] [address] Felice  [phone]    Procedure History & Physical      Sintia Coats     HPI:    Patient  is here for axial low back pain for bilateral L3,4,5 MBB  Labs/imaging studies reviewed   All question and concerns addressed including R/B/A associated with the procedure    Past Medical History:   Diagnosis Date    Arthritis     Asthma     Cancer ([facility] [address] dx 2010    partial removal [facility] ( no chemo or radiation  in remission)    Fibromyalgia     Headache     migraines    Low back pain     Seizure ([facility] [address]     noneptilectic  stress related  none since 2017  on no meds       Past Surgical History:   Procedure Laterality Date    ANESTHESIA NERVE BLOCK Right 06/29/2020    RIGHT L2 AND L4 TRANSFORAMINAL EPIDURAL STEROID INJECTION (CPT 44073,21668) performed by Tesha Isaacs MD at [address] LIVER RESECTION      NERVE BLOCK Bilateral 01/04/2021    BILATERAL LUMBAR FACET MEDIAL BRANCH BLOCK L3,4,5 WITH IV SEDATION (CPT 85212) performed by Tesha Isaacs MD at [facility] Right 02/15/2021    RIGHT L2, L4 TRANSFORAMINAL EPIDURAL STEROID INJECTION WITH SEDATION  (CPT 74193) performed by Tesha Isaacs MD at [facility] Right 03/22/2021    RIGHT L4-5 TRANSFORAMINAL EPIDURAL STEROID INJECTION WITH SEDATION  (CPT 57023) performed by Tesha Isaacs MD at [address] [place] [address]       Prior to Admission medications    Medication Sig Start Date End Date Taking? Authorizing Provider   gabapentin (NEURONTIN) 400 MG capsule Take 1 capsule by mouth 3 times daily for 30 days.  6/14/21 7/14/21  SCAR Mercado CNP   DULoxetine (CYMBALTA) 30 MG extended release capsule Take 1 capsule by mouth daily 6/14/21 7/14/21  SCAR Mercado CNP   methocarbamol (ROBAXIN) 750 MG tablet Take 1 tablet by mouth 2 times daily as needed (spasms) 6/14/21 7/14/21  Roshni Boyer APRN - CNP   ALBUTEROL IN Inhale into the lungs as needed    Historical Provider, MD   acetaminophen (TYLENOL) 325 MG tablet Take 650 mg by mouth every 6 hours as needed for Pain    Historical Provider, MD       Allergies   Allergen Reactions    Aspirin Shortness Of Breath     States lip swelling, trouble breathing      Codeine Shortness Of Breath     States also passes out.  Synthroid [Levothyroxine Sodium] Swelling       Social History     Socioeconomic History    Marital status:      Spouse name: Not on file    Number of children: Not on file    Years of education: Not on file    Highest education level: Not on file   Occupational History    Not on file   Tobacco Use    Smoking status: Never Smoker    Smokeless tobacco: Never Used   Vaping Use    Vaping Use: Never used   Substance and Sexual Activity    Alcohol use: No    Drug use: No    Sexual activity: Yes     Partners: Male   Other Topics Concern    Not on file   Social History Narrative    Not on file     Social Determinants of Health     Financial Resource Strain:     Difficulty of Paying Living Expenses:    Food Insecurity:     Worried About Running Out of Food in the Last Year:     [address] in the Last Year:    Transportation Needs:     Lack of Transportation (Medical):      Lack of Transportation (Non-Medical):    Physical Activity:     Days of Exercise per Week:     Minutes of Exercise per Session:    Stress:     Feeling of Stress :    Social Connections:     Frequency of Communication with Friends and Family:     Frequency of Social Gatherings with Friends and Family:     Attends [employer] Services:     Active Member of Clubs or Organizations:     Attends Club or Organization Meetings:     Marital Status:    Intimate Partner Violence:     Fear of Current or Ex-Partner:     Emotionally Abused:     Physically Abused:     Sexually Abused:        Family History   Problem Relation Age of Onset    Heart Failure Mother     Cancer Brother     Arthritis Brother     Coronary Art Dis Brother     Diabetes Sister     Hypertension Sister     Arthritis Sister          REVIEW OF SYSTEMS:    CONSTITUTIONAL:  negative for  fevers, chills, sweats and fatigue    RESPIRATORY:  negative for  dry cough, cough with sputum, dyspnea, wheezing and chest pain    CARDIOVASCULAR:  negative for chest pain, dyspnea, palpitations, syncope    GASTROINTESTINAL:  negative for nausea, vomiting, change in bowel habits, diarrhea, constipation and abdominal pain    MUSCULOSKELETAL: negative for muscle weakness    SKIN: negative for itching or rashes. BEHAVIOR/PSYCH:  negative for poor appetite, increased appetite, decreased sleep and poor concentration    All other systems negative      PHYSICAL EXAM:    VITALS:  BP (!) 162/86   Pulse 77   Temp 98 °F (36.7 °C) (Skin)   Resp 18   Ht 5' 6\" (1.676 m)   Wt 257 lb (116.6 kg)   SpO2 97%   BMI 41.48 kg/m²     CONSTITUTIONAL:  awake, alert, cooperative, no apparent distress, and appears stated age    EYES: PERRLA, EOMI    LUNGS:  No increased work of breathing, no audible wheezing    CARDIOVASCULAR:  regular rate and rhythm    ABDOMEN:  Soft non tender non distended     EXTREMITIES: no signs of clubbing or cyanosis. MUSCULOSKELETAL: negative for flaccid muscle tone or spastic movements. SKIN: gross examination reveals no signs of rashes, or diaphoresis. NEURO: Cranial nerves II-XII grossly intact. No signs of agitated mood.        Assessment/Plan:    Axial low back pain for bilateral L3,4,5, MBB.

## 2021-07-14 NOTE — PROGRESS NOTES
[facility]  [address] [facility], [address] [place] [address] Felice  [phone]  Telephone follow up visit      Date of Visit:  7/15/2021    CC:       HPI:  Patient here s/p LMBB with improved low back pain and mobility x 4 days. Pain is now back to baseline. Pt followed for low back pain primarily axial with intermittent left leg radicular sx to mid thigh. Appropriate analgesia with current medications regimen: somewhat  Change in quality of symptoms: none  Medication side effects: none  Recent diagnostic testing: none  Recent interventional procedures:7/8/21: s/p Bilateral LMBB L345 with >80% relief x 4 days     She has not been on anticoagulation medications to include none. The patient [facility] [place] not been on herbal supplements.  The patient is not diabetic.     Imaging:      Thoracic Xray 09/16/2020  There are    multilevel degenerative changes      Impression    No acute abnormality of the thoracic spine            Lumbar spine MRI 02/2020  Degenerative changes of the lumbar spine as discussed level by level in the body of the report.  No significant change since prior examination.  Findings are most pronounced at L4-5 with mild to moderate left and mild right neural foraminal stenosis, unchanged.  Right far lateral disc extrusion at L2-3 abutting exiting nerve roots, unchanged.  No significant spinal canal narrowing. Right lower extremity EMG:  This study was Abnormal.    1. There is electrodiagnostic evidence of right lumbar motor radiculopathy, as indicated by abnormal right lumbar paraspinal testing. This is not further defined, however, as all tested right lower extremity muscles are normal.      2. There is no electrodiagnostic evidence of any other peripheral nerve mononeuropathy, plexopathy, or peripheral polyneuropathy in the bilateral lower extremities. Cannot evaluate for left lumbar radiculopathy without completion of needle exam of the left lower extremity.  Cannot evaluate for pure sensory radiculopathy or small fiber neuropathy by electrodiagnostic technique.      Previous treatments: medications. , ztlido itching, dizziness, lethargy with tizanidine                                        Potential Aberrant Drug-Related Behavior:   None      Urine Drug Screening:  None      OARRS report:  07/2021 consistent    Past Medical History: Reviewed    Past Surgical History: Reviewed     Home Medications: Reviewed    Allergies: Reviewed     Social History: Reviewed     REVIEW OF SYSTEMS:     Dane denies fever/chills, chest pain, shortness of breath, new bowel or bladder complaints. All other review of systems was negative. PHYSICAL EXAMINATION:    General:       General appearance: tearful, pleasant and well-hydrated. , in moderate to severe discomfort and A & O x3  Build: Morbidly obese     HEENT:     Head:normocephalic and atraumatic  Sclera: icterus present,      Lungs:     Breathing:Breathing Pattern: regular, no distress     Abdomen:     Shape:non-distended and normal  Tenderness:none     Thoracic spine:     Spine inspection:normal   pationPal:tenderness paravertebral muscles, midline tenderness, facet loading, left, right and negative. Range of motion:abnormal moderately flexion, extension rotation bilateral and is mildly painful.     Lumbar spine:     Spine inspection:normal, exaggerated kyphosis, scoliosis, lordosis and surgical incision scar   CVA tenderness:No   Palpationright facet tenderness   Range of motion:abnormal moderately Lateral bending, flexion, extension rotation right and is moderately painful. Facet loading positive right and left     Musculoskeletal:     Trigger points in Paraveteral:present right side  SI joint tenderness:negative right, negative left. SLR: negative right, negative left, sitting      Extremities:     Tremors:None bilaterally upper and lower  Range of motion, pain with internal rotation of hips negative.   Intact:Yes  Edema: generalized bilateral LE L>R  Acewrap to left arm due to acute fracture     Neurological:     Sensory:diminished to light touch lateral aspect of right leg and right calf  Motor:                          Right Quadriceps3/5          Left Quadriceps5/5           Right Gastrocnemius3/5    Left Gastrocnemius5/5  Right Ant Tibialis3/5  Left Ant Tibialis5/5     Gait:normal     Dermatology:     Skin:no unusual rashes and no skin lesions         Impression:  Chronic low back pain with radiation to the right lower extremity with numbness in the right leg. Lumbar spine MRI 2020 L2-3 right lateral disc extrusion and L4-5 disc bulge with facet hypertrophy and foraminal stenosis. \  Tried and failed TFESI      Plan:  Followed for lbp primarily axial with intermittent right lateral leg radicular sx to knee with some weakness and cramping  Schedule for Bilateral LRFA L345 with sedation   7/8/21: s/p Bilateral LMBB L345 with >80% relief x 4 days   1/4/21:Bilateral Lumbar facet medial branch block L345 with >70% relief but side effects headache and n/t foot   Refill Gabapentin 400mg po tid   Continue Duloxetine 30mg daily   Continue Robaxin 750mg po bid with 2 refills  NSAIDS contraindicated due to allergy to ASA  Continue with OTC pain medications as tolerated  OARRS report reviewed   Patient encouraged to stay active and to lose weight. Failed physical therapy. Treatment plan discussed with the patient including medications side effects.     We discussed with the patient that combining opioids, benzodiazepines, alcohol, illicit drugs or sleep aids increases the risk of respiratory depression including death. We discussed that these medications may cause drowsiness, sedation or dizziness and have counseled the patient not to drive or operate machinery. We have discussed that these medications will be prescribed only by one provider.  We have discussed with the patient about age related risk factors and have thoroughly discussed the importance of taking these medications as prescribed.  The patient verbalizes understanding.

## 2021-07-15 ENCOUNTER — OFFICE VISIT (OUTPATIENT)
Dept: PAIN MANAGEMENT | Age: 51
End: 2021-07-15
Payer: COMMERCIAL

## 2021-07-15 VITALS
WEIGHT: 257 LBS | SYSTOLIC BLOOD PRESSURE: 138 MMHG | DIASTOLIC BLOOD PRESSURE: 90 MMHG | BODY MASS INDEX: 41.3 KG/M2 | HEIGHT: 66 IN | OXYGEN SATURATION: 96 % | TEMPERATURE: 97.5 F | RESPIRATION RATE: 16 BRPM | HEART RATE: 94 BPM

## 2021-07-15 DIAGNOSIS — M43.06 LUMBAR SPONDYLOLYSIS: Primary | ICD-10-CM

## 2021-07-15 DIAGNOSIS — M54.6 PAIN IN THORACIC SPINE: ICD-10-CM

## 2021-07-15 DIAGNOSIS — M51.9 LUMBAR DISC DISORDER: ICD-10-CM

## 2021-07-15 DIAGNOSIS — M47.816 LUMBAR FACET ARTHROPATHY: ICD-10-CM

## 2021-07-15 DIAGNOSIS — G89.4 CHRONIC PAIN SYNDROME: ICD-10-CM

## 2021-07-15 PROCEDURE — 99213 OFFICE O/P EST LOW 20 MIN: CPT | Performed by: NURSE PRACTITIONER

## 2021-07-15 PROCEDURE — G8417 CALC BMI ABV UP PARAM F/U: HCPCS | Performed by: NURSE PRACTITIONER

## 2021-07-15 PROCEDURE — 1036F TOBACCO NON-USER: CPT | Performed by: NURSE PRACTITIONER

## 2021-07-15 PROCEDURE — 3017F COLORECTAL CA SCREEN DOC REV: CPT | Performed by: NURSE PRACTITIONER

## 2021-07-15 PROCEDURE — G8427 DOCREV CUR MEDS BY ELIG CLIN: HCPCS | Performed by: NURSE PRACTITIONER

## 2021-07-15 RX ORDER — METHOCARBAMOL 750 MG/1
750 TABLET, FILM COATED ORAL 2 TIMES DAILY PRN
COMMUNITY
End: 2021-09-22 | Stop reason: SDUPTHER

## 2021-07-15 NOTE — PROGRESS NOTES
Do you currently have any of the following:    Fever: No  Headache:  No  Cough: No  Shortness of breath: No  Exposed to anyone with these symptoms: No                                                                                                                Rickey Bell presents to the [address] [address] on 7/15/2021. Home Mata is complaining of pain in between shoulder blades, tingles and right down to her feet. Right hip down it burns and feels like it is falling asleep. . The pain is constant. The pain is described as aching, throbbing, shooting, stabbing, sharp, burning and numb. Pain is rated on her best day at a 6, on her worst day at a 10, and on average at a 8 on the VAS scale. She took her last dose of duloxetine,tylenol, and robaxin . Home Mata does not have issues with constipation. Any procedures since your last visit: Yes, with 50 % relief x 4 days then it came back. She is  on NSAIDS and  is not on anticoagulation medications to include none and is managed by NA. Pacemaker or defibrillator: No Physician managing device is NA. Medication Contract and Consent for Opioid Use Documents Filed     Patient Documents       Type of Document Status Date Received Received By Description     Medication Contract Received 10/20/2020  9:17 AM FRANCY WILEY pain management pt agreement                   BP (!) 138/90   Pulse 94   Temp 97.5 °F (36.4 °C) (Infrared)   Resp 16   Ht 5' 6\" (1.676 m)   Wt 257 lb (116.6 kg)   SpO2 96%   BMI 41.48 kg/m²      No LMP recorded. Patient has had a hysterectomy.

## 2021-08-03 RX ORDER — GABAPENTIN 400 MG/1
400 CAPSULE ORAL 3 TIMES DAILY
COMMUNITY
End: 2021-08-25

## 2021-08-03 RX ORDER — DULOXETIN HYDROCHLORIDE 30 MG/1
30 CAPSULE, DELAYED RELEASE ORAL DAILY
COMMUNITY
End: 2021-08-25 | Stop reason: SDUPTHER

## 2021-08-04 ENCOUNTER — HOSPITAL ENCOUNTER (OUTPATIENT)
Age: 51
Discharge: HOME | End: 2021-08-06
Payer: COMMERCIAL

## 2021-08-04 DIAGNOSIS — M43.06 LUMBAR SPONDYLOLYSIS: ICD-10-CM

## 2021-08-04 PROCEDURE — U0003 INFECTIOUS AGENT DETECTION BY NUCLEIC ACID (DNA OR RNA); SEVERE ACUTE RESPIRATORY SYNDROME CORONAVIRUS 2 (SARS-COV-2) (CORONAVIRUS DISEASE [COVID-19]), AMPLIFIED PROBE TECHNIQUE, MAKING USE OF HIGH THROUGHPUT TECHNOLOGIES AS DESCRIBED BY CMS-2020-01-R: HCPCS

## 2021-08-04 PROCEDURE — U0005 INFEC AGEN DETEC AMPLI PROBE: HCPCS

## 2021-08-06 ENCOUNTER — ANESTHESIA EVENT (OUTPATIENT)
Dept: OPERATING ROOM | Age: 51
End: 2021-08-06
Payer: COMMERCIAL

## 2021-08-06 LAB
SARS-COV-2: NOT DETECTED
SOURCE: NORMAL

## 2021-08-06 ASSESSMENT — LIFESTYLE VARIABLES: SMOKING_STATUS: 0

## 2021-08-06 NOTE — ANESTHESIA PRE PROCEDURE
Department of Anesthesiology  Preprocedure Note       Name:  Chandrika Nam   Age:  49 y.o.  :  1970                                          MRN:  [id number]         Date:  2021      Surgeon: Melida Justin):  Zohaib Rushing MD    Procedure: Procedure(s):  BILATERAL LUMBAR RADIOFREQUENCY ABLATION L3, L4,L5 WITH SEDATION    Medications prior to admission:   Prior to Admission medications    Medication Sig Start Date End Date Taking? Authorizing Provider   gabapentin (NEURONTIN) 400 MG capsule Take 400 mg by mouth 3 times daily. Yes Historical Provider, MD   DULoxetine (CYMBALTA) 30 MG extended release capsule Take 30 mg by mouth daily   Yes Historical Provider, MD   methocarbamol (ROBAXIN) 750 MG tablet Take 750 mg by mouth 2 times daily as needed    Historical Provider, MD   ALBUTEROL IN Inhale into the lungs as needed    Historical Provider, MD   acetaminophen (TYLENOL) 325 MG tablet Take 650 mg by mouth every 6 hours as needed for Pain    Historical Provider, MD       Current medications:    No current facility-administered medications for this encounter. Current Outpatient Medications   Medication Sig Dispense Refill    gabapentin (NEURONTIN) 400 MG capsule Take 400 mg by mouth 3 times daily.  DULoxetine (CYMBALTA) 30 MG extended release capsule Take 30 mg by mouth daily      methocarbamol (ROBAXIN) 750 MG tablet Take 750 mg by mouth 2 times daily as needed      ALBUTEROL IN Inhale into the lungs as needed      acetaminophen (TYLENOL) 325 MG tablet Take 650 mg by mouth every 6 hours as needed for Pain         Allergies: Allergies   Allergen Reactions    Aspirin Shortness Of Breath     States lip swelling, trouble breathing      Codeine Shortness Of Breath     States also passes out.     Synthroid [Levothyroxine Sodium] Swelling       Problem List:    Patient Active Problem List   Diagnosis Code    Chronic pain syndrome G89.4    Lumbar disc disorder M51.9    Lumbar facet arthropathy M47.816    Lumbar radiculopathy M54.16    Pain in right leg M79.604    Pain in thoracic spine M54.6    Lumbar spondylolysis M43.06       Past Medical History:        Diagnosis Date    Arthritis     Asthma     Cancer ([facility] [address] dx 2010    liver cancer partial removal [facility] ( no chemo or radiation  in remission)    Fibromyalgia     Headache     migraines    Low back pain     Seizure ([facility] [address]     noneptilectic  stress related  none since 2017  on no meds       Past Surgical History:        Procedure Laterality Date    ANESTHESIA NERVE BLOCK Right 06/29/2020    RIGHT L2 AND L4 TRANSFORAMINAL EPIDURAL STEROID INJECTION (CPT 72951,55415) performed by Payton Carr MD at [place] [address]      for malignancy    NERVE BLOCK Bilateral 01/04/2021    BILATERAL LUMBAR FACET MEDIAL BRANCH BLOCK L3,4,5 WITH IV SEDATION (CPT 02947) performed by Payton Carr MD at [facility] Right 02/15/2021    RIGHT L2, L4 TRANSFORAMINAL EPIDURAL STEROID INJECTION WITH SEDATION  (CPT 75279) performed by Payton Carr MD at [facility] Right 03/22/2021    RIGHT L4-5 TRANSFORAMINAL EPIDURAL STEROID INJECTION WITH SEDATION  (CPT 48272) performed by Payton Carr MD at [facility] Bilateral 07/08/2021    LUMBAR MEDIAL BRANCH BLOCK BILATERAL L3, L4, L5 WITH SEDATION (CPT 30086) performed by Payton Carr MD at [address] [place] [address] History:    Social History     Tobacco Use    Smoking status: Never Smoker    Smokeless tobacco: Never Used   Substance Use Topics    Alcohol use:  No                                Counseling given: Not Answered      Vital Signs (Current):   Vitals:    08/03/21 1357   Weight: 257 lb (116.6 kg)   Height: 5' 6\" (1.676 m)                                              BP Readings from Last 3 Encounters:   07/15/21 (!) 138/90   07/08/21 129/71   07/08/21 (!) 126/50       NPO Status:  >8.H                                                                               BMI:   Wt Readings from Last 3 Encounters:   07/15/21 257 lb (116.6 kg)   07/08/21 257 lb (116.6 kg)   06/14/21 235 lb (106.6 kg)     Body mass index is 41.48 kg/m². CBC:   Lab Results   Component Value Date    WBC 9.5 10/17/2016    RBC 4.99 10/17/2016    HGB 14.3 10/17/2016    HCT 41.8 10/17/2016    MCV 83.7 10/17/2016    RDW 12.8 10/17/2016     10/17/2016       CMP:   Lab Results   Component Value Date     10/17/2016    K 3.7 10/17/2016     10/17/2016    CO2 19 10/17/2016    BUN 16 10/17/2016    CREATININE 1.0 10/17/2016    GFRAA >60 10/17/2016    LABGLOM 60 10/17/2016    GLUCOSE 126 10/17/2016    CALCIUM 10.0 10/17/2016       POC Tests: No results for input(s): POCGLU, POCNA, POCK, POCCL, POCBUN, POCHEMO, POCHCT in the last 72 hours.     Coags: No results found for: PROTIME, INR, APTT    HCG (If Applicable): No results found for: PREGTESTUR, PREGSERUM, HCG, HCGQUANT     ABGs: No results found for: PHART, PO2ART, TOL4EOQ, YRQ0MVU, BEART, A1LGTGYP     Type & Screen (If Applicable):  No results found for: LABABO, LABRH    Drug/Infectious Status (If Applicable):  No results found for: HIV, HEPCAB    COVID-19 Screening (If Applicable):   Lab Results   Component Value Date    COVID19 Not Detected 07/01/2021           Anesthesia Evaluation  Patient summary reviewed no history of anesthetic complications:   Airway: Mallampati: III  TM distance: >3 FB   Neck ROM: full  Mouth opening: > = 3 FB Dental:    (+) upper dentures and partials      Pulmonary: breath sounds clear to auscultation  (+) asthma:     (-) not a current smoker                           Cardiovascular:  Exercise tolerance: good (>4 METS),           Rhythm: regular  Rate: normal                    Neuro/Psych:   (+) seizures: well controlled, neuromuscular disease:, headaches:,             GI/Hepatic/Renal:   (+) liver disease:, morbid obesity          Endo/Other:    (+) : arthritis:., malignancy/cancer (liver CA  s/p partial hepatectomy). Abdominal:   (+) obese,           Vascular: Other Findings:           Anesthesia Plan      MAC     ASA 3     (Liver CA  In remission)  Induction: intravenous. Anesthetic plan and risks discussed with patient. Plan discussed with CRNA.                 Srikanth Chanel MD   8/6/2021

## 2021-08-09 ENCOUNTER — ANESTHESIA (OUTPATIENT)
Dept: OPERATING ROOM | Age: 51
End: 2021-08-09
Payer: COMMERCIAL

## 2021-08-09 ENCOUNTER — HOSPITAL ENCOUNTER (OUTPATIENT)
Dept: OPERATING ROOM | Age: 51
Setting detail: OUTPATIENT SURGERY
Discharge: HOME | End: 2021-08-09
Attending: PAIN MEDICINE
Payer: COMMERCIAL

## 2021-08-09 ENCOUNTER — HOSPITAL ENCOUNTER (OUTPATIENT)
Age: 51
Setting detail: OUTPATIENT SURGERY
Discharge: HOME | End: 2021-08-09
Attending: PAIN MEDICINE | Admitting: PAIN MEDICINE
Payer: COMMERCIAL

## 2021-08-09 VITALS
SYSTOLIC BLOOD PRESSURE: 149 MMHG | DIASTOLIC BLOOD PRESSURE: 96 MMHG | OXYGEN SATURATION: 99 % | RESPIRATION RATE: 27 BRPM

## 2021-08-09 VITALS
HEIGHT: 66 IN | HEART RATE: 76 BPM | WEIGHT: 259 LBS | SYSTOLIC BLOOD PRESSURE: 132 MMHG | OXYGEN SATURATION: 97 % | BODY MASS INDEX: 41.62 KG/M2 | RESPIRATION RATE: 14 BRPM | DIASTOLIC BLOOD PRESSURE: 52 MMHG | TEMPERATURE: 98.6 F

## 2021-08-09 DIAGNOSIS — M43.06 LUMBAR SPONDYLOLYSIS: Primary | ICD-10-CM

## 2021-08-09 DIAGNOSIS — M47.896 OTHER OSTEOARTHRITIS OF SPINE, LUMBAR REGION: ICD-10-CM

## 2021-08-09 PROCEDURE — 3600000015 HC SURGERY LEVEL 5 ADDTL 15MIN: Performed by: PAIN MEDICINE

## 2021-08-09 PROCEDURE — 7100000010 HC PHASE II RECOVERY - FIRST 15 MIN: Performed by: PAIN MEDICINE

## 2021-08-09 PROCEDURE — 3700000001 HC ADD 15 MINUTES (ANESTHESIA): Performed by: PAIN MEDICINE

## 2021-08-09 PROCEDURE — 2500000003 HC RX 250 WO HCPCS: Performed by: PAIN MEDICINE

## 2021-08-09 PROCEDURE — C1713 ANCHOR/SCREW BN/BN,TIS/BN: HCPCS | Performed by: PAIN MEDICINE

## 2021-08-09 PROCEDURE — 2580000003 HC RX 258: Performed by: ANESTHESIOLOGY

## 2021-08-09 PROCEDURE — 3700000000 HC ANESTHESIA ATTENDED CARE: Performed by: PAIN MEDICINE

## 2021-08-09 PROCEDURE — 3209999900 FLUORO FOR SURGICAL PROCEDURES

## 2021-08-09 PROCEDURE — 7100000011 HC PHASE II RECOVERY - ADDTL 15 MIN: Performed by: PAIN MEDICINE

## 2021-08-09 PROCEDURE — 2709999900 HC NON-CHARGEABLE SUPPLY: Performed by: PAIN MEDICINE

## 2021-08-09 PROCEDURE — 64636 DESTROY L/S FACET JNT ADDL: CPT | Performed by: PAIN MEDICINE

## 2021-08-09 PROCEDURE — 6360000002 HC RX W HCPCS: Performed by: NURSE ANESTHETIST, CERTIFIED REGISTERED

## 2021-08-09 PROCEDURE — 64635 DESTROY LUMB/SAC FACET JNT: CPT | Performed by: PAIN MEDICINE

## 2021-08-09 PROCEDURE — 6360000002 HC RX W HCPCS: Performed by: PAIN MEDICINE

## 2021-08-09 PROCEDURE — 3600000005 HC SURGERY LEVEL 5 BASE: Performed by: PAIN MEDICINE

## 2021-08-09 RX ORDER — LIDOCAINE HYDROCHLORIDE 20 MG/ML
INJECTION, SOLUTION EPIDURAL; INFILTRATION; INTRACAUDAL; PERINEURAL PRN
Status: DISCONTINUED | OUTPATIENT
Start: 2021-08-09 | End: 2021-08-09 | Stop reason: ALTCHOICE

## 2021-08-09 RX ORDER — HYDROCODONE BITARTRATE AND ACETAMINOPHEN 5; 325 MG/1; MG/1
2 TABLET ORAL PRN
Status: DISCONTINUED | OUTPATIENT
Start: 2021-08-09 | End: 2021-08-09 | Stop reason: HOSPADM

## 2021-08-09 RX ORDER — FENTANYL CITRATE 50 UG/ML
50 INJECTION, SOLUTION INTRAMUSCULAR; INTRAVENOUS EVERY 5 MIN PRN
Status: DISCONTINUED | OUTPATIENT
Start: 2021-08-09 | End: 2021-08-09 | Stop reason: HOSPADM

## 2021-08-09 RX ORDER — HYDRALAZINE HYDROCHLORIDE 20 MG/ML
5 INJECTION INTRAMUSCULAR; INTRAVENOUS EVERY 10 MIN PRN
Status: DISCONTINUED | OUTPATIENT
Start: 2021-08-09 | End: 2021-08-09 | Stop reason: HOSPADM

## 2021-08-09 RX ORDER — HYDROCODONE BITARTRATE AND ACETAMINOPHEN 5; 325 MG/1; MG/1
1 TABLET ORAL PRN
Status: DISCONTINUED | OUTPATIENT
Start: 2021-08-09 | End: 2021-08-09 | Stop reason: HOSPADM

## 2021-08-09 RX ORDER — PROPOFOL 10 MG/ML
INJECTION, EMULSION INTRAVENOUS PRN
Status: DISCONTINUED | OUTPATIENT
Start: 2021-08-09 | End: 2021-08-09 | Stop reason: SDUPTHER

## 2021-08-09 RX ORDER — LABETALOL HYDROCHLORIDE 5 MG/ML
5 INJECTION, SOLUTION INTRAVENOUS EVERY 10 MIN PRN
Status: DISCONTINUED | OUTPATIENT
Start: 2021-08-09 | End: 2021-08-09 | Stop reason: HOSPADM

## 2021-08-09 RX ORDER — PROMETHAZINE HYDROCHLORIDE 25 MG/ML
25 INJECTION, SOLUTION INTRAMUSCULAR; INTRAVENOUS
Status: DISCONTINUED | OUTPATIENT
Start: 2021-08-09 | End: 2021-08-09 | Stop reason: HOSPADM

## 2021-08-09 RX ORDER — SODIUM CHLORIDE, SODIUM LACTATE, POTASSIUM CHLORIDE, CALCIUM CHLORIDE 600; 310; 30; 20 MG/100ML; MG/100ML; MG/100ML; MG/100ML
INJECTION, SOLUTION INTRAVENOUS CONTINUOUS
Status: DISCONTINUED | OUTPATIENT
Start: 2021-08-09 | End: 2021-08-09 | Stop reason: HOSPADM

## 2021-08-09 RX ORDER — MORPHINE SULFATE 2 MG/ML
1 INJECTION, SOLUTION INTRAMUSCULAR; INTRAVENOUS EVERY 5 MIN PRN
Status: DISCONTINUED | OUTPATIENT
Start: 2021-08-09 | End: 2021-08-09 | Stop reason: HOSPADM

## 2021-08-09 RX ORDER — FENTANYL CITRATE 50 UG/ML
INJECTION, SOLUTION INTRAMUSCULAR; INTRAVENOUS PRN
Status: DISCONTINUED | OUTPATIENT
Start: 2021-08-09 | End: 2021-08-09 | Stop reason: SDUPTHER

## 2021-08-09 RX ORDER — DIPHENHYDRAMINE HYDROCHLORIDE 50 MG/ML
12.5 INJECTION INTRAMUSCULAR; INTRAVENOUS
Status: DISCONTINUED | OUTPATIENT
Start: 2021-08-09 | End: 2021-08-09 | Stop reason: HOSPADM

## 2021-08-09 RX ORDER — MEPERIDINE HYDROCHLORIDE 25 MG/ML
12.5 INJECTION INTRAMUSCULAR; INTRAVENOUS; SUBCUTANEOUS EVERY 5 MIN PRN
Status: DISCONTINUED | OUTPATIENT
Start: 2021-08-09 | End: 2021-08-09 | Stop reason: HOSPADM

## 2021-08-09 RX ORDER — MIDAZOLAM HYDROCHLORIDE 1 MG/ML
INJECTION INTRAMUSCULAR; INTRAVENOUS PRN
Status: DISCONTINUED | OUTPATIENT
Start: 2021-08-09 | End: 2021-08-09 | Stop reason: SDUPTHER

## 2021-08-09 RX ADMIN — FENTANYL CITRATE 50 MCG: 50 INJECTION, SOLUTION INTRAMUSCULAR; INTRAVENOUS at 10:27

## 2021-08-09 RX ADMIN — SODIUM CHLORIDE, POTASSIUM CHLORIDE, SODIUM LACTATE AND CALCIUM CHLORIDE: 600; 310; 30; 20 INJECTION, SOLUTION INTRAVENOUS at 10:02

## 2021-08-09 RX ADMIN — MIDAZOLAM 2 MG: 1 INJECTION INTRAMUSCULAR; INTRAVENOUS at 10:22

## 2021-08-09 RX ADMIN — PROPOFOL 30 MG: 10 INJECTION, EMULSION INTRAVENOUS at 10:27

## 2021-08-09 RX ADMIN — PROPOFOL 40 MG: 10 INJECTION, EMULSION INTRAVENOUS at 10:36

## 2021-08-09 RX ADMIN — FENTANYL CITRATE 50 MCG: 50 INJECTION, SOLUTION INTRAMUSCULAR; INTRAVENOUS at 10:23

## 2021-08-09 ASSESSMENT — PULMONARY FUNCTION TESTS
PIF_VALUE: 0

## 2021-08-09 ASSESSMENT — PAIN SCALES - GENERAL
PAINLEVEL_OUTOF10: 0
PAINLEVEL_OUTOF10: 0

## 2021-08-09 ASSESSMENT — PAIN - FUNCTIONAL ASSESSMENT: PAIN_FUNCTIONAL_ASSESSMENT: 0-10

## 2021-08-09 ASSESSMENT — PAIN DESCRIPTION - DESCRIPTORS: DESCRIPTORS: ACHING;CONSTANT

## 2021-08-09 NOTE — ANESTHESIA POSTPROCEDURE EVALUATION
Department of Anesthesiology  Postprocedure Note    Patient: Goldie Hidalgo  MRN: [id number]  YOB: 1970  Date of evaluation: 8/9/2021  Time:  11:12 AM     Procedure Summary     Date: 08/09/21 Room / Location: [address] [place] [address] 04 / [address] [facility]    Anesthesia Start: 4387 Anesthesia Stop: 9436    Procedure: BILATERAL LUMBAR RADIOFREQUENCY ABLATION L3, L4,L5 WITH SEDATION (Bilateral ) Diagnosis: (LUMBAR SPONDYLOSIS)    Surgeons: Selvin Scherer MD Responsible Provider: Mehnaz Carr MD    Anesthesia Type: MAC ASA Status: 3          Anesthesia Type: MAC    Alphonse Phase I: Alphonse Score: 10    Alphonse Phase II: Alphonse Score: 10    Last vitals: Reviewed and per EMR flowsheets.        Anesthesia Post Evaluation    Patient location during evaluation: PACU  Patient participation: complete - patient participated  Level of consciousness: awake  Airway patency: patent  Nausea & Vomiting: no nausea and no vomiting  Complications: no  Cardiovascular status: hemodynamically stable  Respiratory status: room air and spontaneous ventilation  Hydration status: stable

## 2021-08-09 NOTE — OP NOTE
2021    Patient: Roxana Herring  :  1970  Age:  49 y.o. Sex:  female     PRE-OPERATIVE DIAGNOSIS: Bilateral Lumbar spondylosis, lumbar facet arthropathy. POST-OPERATIVE DIAGNOSIS: Same. PROCEDURE:  Fluoroscopic-guided Bilateral  Lumbar facet medial branch radiofrequency ablation at levels L3,4,5.    SURGEON:  MINDY Gamble M.D. ANESTHESIA: MAC    ESTIMATED BLOOD LOSS: None.  ______________________________________________________________________  HISTORY & PHYSICAL: Roxana Herring presents today for fluoroscopic-guided Bilateral lumbar facet medial branch radiofrequency ablation at levels L3,4,5. The potential complications of the procedure were explained to Patricia Banda again today and she has elected to undergo the aforementioned procedure. Stella complete History & Physical examination were reviewed in depth, a copy of which is in the chart. DESCRIPTION OF PROCEDURE:    After confirming written and informed consent, a time-out was performed and Stella name and date of birth, the procedure to be performed as well as the plan for the location of the needle insertion were confirmed. The patient was brought into the procedure room and placed in the prone position on the fluoroscopy table. Standard monitors were placed and vital signs were observed throughout the procedure. The area of the lumbar spine and upper buttocks was sterilely prepped with chloraprep and draped in a sterile manner. AP fluoroscopy was used to identify and david bartons point at the targeted area. A 22 gauge 10 mm radiofrequency probe was advanced toward each of these points. Once bone was contacted, negative aspiration for blood and CSF was confirmed, sensory stimulation was performed at 50 Hz and at 0.4 volts generating a pressure sensation. Motor stimulation < 2.0 volts elicited multifidus twitching without any radicular symptoms.  1 ml of 2% lidocaine was injected prior to lesioning, which was performed for 90 seconds at 90 degrees centigrade. Once the lesions were complete, a solution of 0.25% marcaine 3 cc and 40 mg DepoMedrol was injected and distributed equally through each probe. The probes were removed . The patient's back was cleaned and bandages were placed over the needle insertion sites. Disposition the patient tolerated the procedure well and there were no complications . Vital signs remained stable throughout the procedure. The patient was escorted to the recovery area where they remained until discharge and written discharge instructions for the procedure were given. Plan: Bong Armendariz will return to our pain management center as scheduled.      Yash Crowder MD

## 2021-08-09 NOTE — H&P
[address] [facility], [address] [place] [address] [place]  [phone]    Procedure History & Physical      [facility]     HPI:    Patient  is here for axial low back pain for bilateral L3,4,5 MB RFA  Labs/imaging studies reviewed   All question and concerns addressed including R/B/A associated with the procedure    Past Medical History:   Diagnosis Date    Arthritis     Asthma     Cancer ([facility] [address] dx 2010    liver cancer partial removal [facility] ( no chemo or radiation  in remission)    Fibromyalgia     Headache     migraines    Low back pain     Seizure ([facility] [address]     noneptilectic  stress related  none since 2017  on no meds       Past Surgical History:   Procedure Laterality Date    ANESTHESIA NERVE BLOCK Right 06/29/2020    RIGHT L2 AND L4 TRANSFORAMINAL EPIDURAL STEROID INJECTION (CPT 73775,03495) performed by Humaira Colon MD at [place] [address]      for malignancy    NERVE BLOCK Bilateral 01/04/2021    BILATERAL LUMBAR FACET MEDIAL BRANCH BLOCK L3,4,5 WITH IV SEDATION (CPT 24369) performed by Hmuaira Colon MD at [facility] Right 02/15/2021    RIGHT L2, L4 TRANSFORAMINAL EPIDURAL STEROID INJECTION WITH SEDATION  (CPT 30401) performed by Humaira Colon MD at [facility] Right 03/22/2021    RIGHT L4-5 TRANSFORAMINAL EPIDURAL STEROID INJECTION WITH SEDATION  (CPT 49368) performed by Humaira Colon MD at [facility] Bilateral 07/08/2021    LUMBAR MEDIAL BRANCH BLOCK BILATERAL L3, L4, L5 WITH SEDATION (CPT 58444) performed by Humaira Colon MD at [address] [place] [address]       Prior to Admission medications    Medication Sig Start Date End Date Taking? Authorizing Provider   gabapentin (NEURONTIN) 400 MG capsule Take 400 mg by mouth 3 times daily.    Yes Historical Provider, MD   DULoxetine (CYMBALTA) 30 MG extended release capsule Take 30 mg by mouth daily   Yes Historical Provider, MD   methocarbamol (ROBAXIN) 750 MG tablet Take 750 mg by mouth 2 times daily as needed   Yes Historical Provider, MD   ALBUTEROL IN Inhale into the lungs as needed    Historical Provider, MD   acetaminophen (TYLENOL) 325 MG tablet Take 650 mg by mouth every 6 hours as needed for Pain    Historical Provider, MD       Allergies   Allergen Reactions    Aspirin Shortness Of Breath     States lip swelling, trouble breathing      Codeine Shortness Of Breath     States also passes out.  Synthroid [Levothyroxine Sodium] Swelling       Social History     Socioeconomic History    Marital status:      Spouse name: Not on file    Number of children: Not on file    Years of education: Not on file    Highest education level: Not on file   Occupational History    Not on file   Tobacco Use    Smoking status: Never Smoker    Smokeless tobacco: Never Used   Vaping Use    Vaping Use: Never used   Substance and Sexual Activity    Alcohol use: No    Drug use: No    Sexual activity: Yes     Partners: Male   Other Topics Concern    Not on file   Social History Narrative    Not on file     Social Determinants of Health     Financial Resource Strain:     Difficulty of Paying Living Expenses:    Food Insecurity:     Worried About Running Out of Food in the Last Year:     [address] in the Last Year:    Transportation Needs:     Lack of Transportation (Medical):      Lack of Transportation (Non-Medical):    Physical Activity:     Days of Exercise per Week:     Minutes of Exercise per Session:    Stress:     Feeling of Stress :    Social Connections:     Frequency of Communication with Friends and Family:     Frequency of Social Gatherings with Friends and Family:     Attends Spiritism Services:     Active Member of Clubs or Organizations:     Attends Club or Organization Meetings:     Marital Status:    Intimate Partner Violence:     Fear of Current or Ex-Partner:     Emotionally Abused:     Physically Abused:     Sexually Abused:        Family History   Problem Relation Age of Onset    Heart Failure Mother     Cancer Brother     Arthritis Brother     Coronary Art Dis Brother     Diabetes Sister     Hypertension Sister     Arthritis Sister          REVIEW OF SYSTEMS:    CONSTITUTIONAL:  negative for  fevers, chills, sweats and fatigue    RESPIRATORY:  negative for  dry cough, cough with sputum, dyspnea, wheezing and chest pain    CARDIOVASCULAR:  negative for chest pain, dyspnea, palpitations, syncope    GASTROINTESTINAL:  negative for nausea, vomiting, change in bowel habits, diarrhea, constipation and abdominal pain    MUSCULOSKELETAL: negative for muscle weakness    SKIN: negative for itching or rashes. BEHAVIOR/PSYCH:  negative for poor appetite, increased appetite, decreased sleep and poor concentration    All other systems negative      PHYSICAL EXAM:    VITALS:  BP (!) 150/52   Pulse 77   Temp 97.3 °F (36.3 °C) (Skin)   Resp 16   Ht 5' 6\" (1.676 m)   Wt 259 lb (117.5 kg)   SpO2 99%   BMI 41.80 kg/m²     CONSTITUTIONAL:  awake, alert, cooperative, no apparent distress, and appears stated age    EYES: PERRLA, EOMI    LUNGS:  No increased work of breathing, no audible wheezing    CARDIOVASCULAR:  regular rate and rhythm    ABDOMEN:  Soft non tender non distended     EXTREMITIES: no signs of clubbing or cyanosis. MUSCULOSKELETAL: negative for flaccid muscle tone or spastic movements. SKIN: gross examination reveals no signs of rashes, or diaphoresis. NEURO: Cranial nerves II-XII grossly intact. No signs of agitated mood. Assessment/Plan:    Axial low back pain for bilateral L3,4,5 MB RFA.

## 2021-08-24 NOTE — PROGRESS NOTES
[address]  [address] [facility], [address] [place] [address] [place]  [phone]  Telephone follow up visit      Date of Visit:  8/25/2021    CC:       HPI:  Patient here s/p LRFA with no relief and here with bilateral LE edema. Pt continues to have low back pain primarily axial with intermittent left leg radicular sx to mid thigh. Pt notes low back pain main complaint and getting progressively worse and debilitating. Pt now having to use assistive device to get into car or tub at home. Pt also notes numbness and tingling in bilateral legs      Appropriate analgesia with current medications regimen: somewhat  Change in quality of symptoms: none  Medication side effects: none  Recent diagnostic testing: none  Recent interventional procedures:89/21: S/p Bilateral LRFA L345 still assessing, not improved thus far     She has not been on anticoagulation medications to include none. The patient Pearl Cadein not been on herbal supplements.  The patient is not diabetic.     Imaging:      Thoracic Xray 09/16/2020  There are    multilevel degenerative changes      Impression    No acute abnormality of the thoracic spine            Lumbar spine MRI 02/2020  Degenerative changes of the lumbar spine as discussed level by level in the body of the report.  No significant change since prior examination.  Findings are most pronounced at L4-5 with mild to moderate left and mild right neural foraminal stenosis, unchanged.  Right far lateral disc extrusion at L2-3 abutting exiting nerve roots, unchanged.  No significant spinal canal narrowing. Right lower extremity EMG:  This study was Abnormal.    1. There is electrodiagnostic evidence of right lumbar motor radiculopathy, as indicated by abnormal right lumbar paraspinal testing. This is not further defined, however, as all tested right lower extremity muscles are normal.      2.  There is no electrodiagnostic evidence of any other peripheral nerve mononeuropathy, plexopathy, or peripheral polyneuropathy in the bilateral lower extremities. Cannot evaluate for left lumbar radiculopathy without completion of needle exam of the left lower extremity. Cannot evaluate for pure sensory radiculopathy or small fiber neuropathy by electrodiagnostic technique.      Previous treatments: medications. , ztlido itching, dizziness, lethargy with tizanidine                                        Potential Aberrant Drug-Related Behavior:   None      Urine Drug Screening:  None      OARRS report:  08/2021 consistent    Past Medical History: Reviewed    Past Surgical History: Reviewed     Home Medications: Reviewed    Allergies: Reviewed     Social History: Reviewed     REVIEW OF SYSTEMS:     Nazario Griffin denies fever/chills, chest pain, shortness of breath, new bowel or bladder complaints. All other review of systems was negative. PHYSICAL EXAMINATION:    General:       General appearance: tearful, pleasant and well-hydrated. , in moderate to severe discomfort and A & O x3  Build: Morbidly obese     HEENT:     Head:normocephalic and atraumatic  Sclera: icterus present,      Lungs:     Breathing:Breathing Pattern: regular, no distress     Abdomen:     Shape:non-distended and normal  Tenderness:none     Thoracic spine:     Spine inspection:normal   pationPal:tenderness paravertebral muscles, midline tenderness, facet loading, left, right and negative. Range of motion:abnormal moderately flexion, extension rotation bilateral and is mildly painful.     Lumbar spine:     Spine inspection:normal, exaggerated kyphosis, scoliosis, lordosis and surgical incision scar   CVA tenderness:No   Palpationright facet tenderness   Range of motion:abnormal moderately Lateral bending, flexion, extension rotation right and is moderately painful. Facet loading positive right and left     Musculoskeletal:     Trigger points in Paraveteral:present right side  SI joint tenderness:negative right, negative left.   SLR: negative right, negative left, sitting      Extremities:     Tremors:None bilaterally upper and lower  Range of motion, pain with internal rotation of hips negative. Intact:Yes  Edema: generalized bilateral LE L>R  Acewrap to left arm due to acute fracture     Neurological:     Sensory:diminished to light touch lateral aspect of right leg and right calf  Motor:                          Right Quadriceps3/5          Left Quadriceps5/5           Right Gastrocnemius3/5    Left Gastrocnemius5/5  Right Ant Tibialis3/5  Left Ant Tibialis5/5     Gait:normal     Dermatology:     Skin:no unusual rashes and no skin lesions         Impression:  Chronic low back pain with radiation to the right lower extremity with numbness in the right leg. Lumbar spine MRI 2020 L2-3 right lateral disc extrusion and L4-5 disc bulge with facet hypertrophy and foraminal stenosis. \  Tried and failed TFESI      Plan:  Followed for lbp primarily axial worsening with intermittent right lateral leg radicular sx to knee with some weakness and cramping  8/9/21: S/p Bilateral LRFA L345 still assessing but no relief thus far  Referral to Neurosurgeon, failed injections, physical therapy, medications  Refill Gabapentin 400mg po tid   Increase Duloxetine 30mg po bid    DC Robaxin   Trial Baclofen 5mg po tid   NSAIDS contraindicated due to allergy to ASA  Continue with OTC pain medications as tolerated  OARRS report reviewed   Patient encouraged to stay active and to lose weight. Failed physical therapy. Treatment plan discussed with the patient including medications side effects.     We discussed with the patient that combining opioids, benzodiazepines, alcohol, illicit drugs or sleep aids increases the risk of respiratory depression including death. We discussed that these medications may cause drowsiness, sedation or dizziness and have counseled the patient not to drive or operate machinery.  We have discussed that these medications will be prescribed only by one provider. We have discussed with the patient about age related risk factors and have thoroughly discussed the importance of taking these medications as prescribed.  The patient verbalizes understanding.

## 2021-08-25 ENCOUNTER — OFFICE VISIT (OUTPATIENT)
Dept: PAIN MANAGEMENT | Age: 51
End: 2021-08-25
Payer: COMMERCIAL

## 2021-08-25 VITALS
DIASTOLIC BLOOD PRESSURE: 84 MMHG | WEIGHT: 249 LBS | HEART RATE: 70 BPM | TEMPERATURE: 97.8 F | BODY MASS INDEX: 40.02 KG/M2 | RESPIRATION RATE: 16 BRPM | HEIGHT: 66 IN | SYSTOLIC BLOOD PRESSURE: 148 MMHG

## 2021-08-25 DIAGNOSIS — G89.4 CHRONIC PAIN SYNDROME: ICD-10-CM

## 2021-08-25 DIAGNOSIS — M51.9 LUMBAR DISC DISORDER: Primary | ICD-10-CM

## 2021-08-25 DIAGNOSIS — M54.16 LUMBAR RADICULOPATHY: ICD-10-CM

## 2021-08-25 DIAGNOSIS — M43.06 LUMBAR SPONDYLOLYSIS: ICD-10-CM

## 2021-08-25 DIAGNOSIS — M47.816 LUMBAR FACET ARTHROPATHY: ICD-10-CM

## 2021-08-25 PROCEDURE — 1036F TOBACCO NON-USER: CPT | Performed by: NURSE PRACTITIONER

## 2021-08-25 PROCEDURE — G8417 CALC BMI ABV UP PARAM F/U: HCPCS | Performed by: NURSE PRACTITIONER

## 2021-08-25 PROCEDURE — 99213 OFFICE O/P EST LOW 20 MIN: CPT | Performed by: NURSE PRACTITIONER

## 2021-08-25 PROCEDURE — G8427 DOCREV CUR MEDS BY ELIG CLIN: HCPCS | Performed by: NURSE PRACTITIONER

## 2021-08-25 PROCEDURE — 3017F COLORECTAL CA SCREEN DOC REV: CPT | Performed by: NURSE PRACTITIONER

## 2021-08-25 RX ORDER — GABAPENTIN 600 MG/1
600 TABLET ORAL 3 TIMES DAILY
Qty: 90 TABLET | Refills: 0 | Status: SHIPPED
Start: 2021-08-25 | End: 2021-10-18 | Stop reason: SDUPTHER

## 2021-08-25 RX ORDER — BACLOFEN 5 MG/1
5 TABLET ORAL 3 TIMES DAILY
Qty: 90 TABLET | Refills: 0 | Status: SHIPPED
Start: 2021-08-25 | End: 2021-09-22

## 2021-08-25 RX ORDER — DULOXETIN HYDROCHLORIDE 30 MG/1
30 CAPSULE, DELAYED RELEASE ORAL 2 TIMES DAILY
Qty: 60 CAPSULE | Refills: 1 | Status: SHIPPED
Start: 2021-08-25 | End: 2021-09-22 | Stop reason: SDUPTHER

## 2021-08-25 NOTE — PROGRESS NOTES
Do you currently have any of the following:    Fever: No  Headache:  No  Cough: No  Shortness of breath: No  Exposed to anyone with these symptoms: No                                                                                                                Sintia Coats presents to the [facility] on 8/25/2021. Beata Pereira is complaining of pain in her low back. The pain is constant. The pain is described as burning and needles in her back. Pain is rated on her best day at a 6, on her worst day at a 10, and on average at a 8 on the VAS scale. She took her last dose of Neurontin and tylenol today. Beata Pereira does not have issues with constipation. Any procedures since your last visit: Yes, 8-3-21BILATERAL LUMBAR RADIOFREQUENCY ABLATION L3, L4,L5 WITH SEDATION, did not have any relief, she thinks it has made the pain worse, cannot walk for long periods cannot stand for long periods. Since procedure, both her lower legs are swollen. She is not on NSAIDS and  is not on anticoagulation medications to include none. Pacemaker or defibrillator: No.    Medication Contract and Consent for Opioid Use Documents Filed     Patient Documents       Type of Document Status Date Received Received By Description     Medication Contract Received 10/20/2020  9:17 AM FRANCY WILEY pain management pt agreement                   BP (!) 148/84   Pulse 70   Temp 97.8 °F (36.6 °C) (Infrared)   Resp 16   Ht 5' 6\" (1.676 m)   Wt 249 lb (112.9 kg)   BMI 40.19 kg/m²      No LMP recorded. Patient has had a hysterectomy.

## 2021-09-10 ENCOUNTER — OFFICE VISIT (OUTPATIENT)
Dept: NEUROSURGERY | Age: 51
End: 2021-09-10
Payer: COMMERCIAL

## 2021-09-10 VITALS
HEIGHT: 66 IN | RESPIRATION RATE: 18 BRPM | BODY MASS INDEX: 40.02 KG/M2 | OXYGEN SATURATION: 98 % | WEIGHT: 249 LBS | DIASTOLIC BLOOD PRESSURE: 82 MMHG | HEART RATE: 86 BPM | SYSTOLIC BLOOD PRESSURE: 122 MMHG | TEMPERATURE: 99.2 F

## 2021-09-10 DIAGNOSIS — M54.16 LUMBAR RADICULOPATHY: Primary | ICD-10-CM

## 2021-09-10 PROCEDURE — 99203 OFFICE O/P NEW LOW 30 MIN: CPT

## 2021-09-10 ASSESSMENT — ENCOUNTER SYMPTOMS
VOMITING: 0
NAUSEA: 0
BACK PAIN: 0
TROUBLE SWALLOWING: 0
SHORTNESS OF BREATH: 0
ABDOMINAL DISTENTION: 0
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
## 2021-09-10 NOTE — PATIENT INSTRUCTIONS

## 2021-09-22 ENCOUNTER — TELEPHONE (OUTPATIENT)
Dept: NEUROSURGERY | Age: 51
End: 2021-09-22

## 2021-09-22 ENCOUNTER — OFFICE VISIT (OUTPATIENT)
Dept: PAIN MANAGEMENT | Age: 51
End: 2021-09-22
Payer: COMMERCIAL

## 2021-09-22 VITALS
WEIGHT: 249 LBS | BODY MASS INDEX: 40.02 KG/M2 | HEIGHT: 66 IN | RESPIRATION RATE: 16 BRPM | SYSTOLIC BLOOD PRESSURE: 128 MMHG | DIASTOLIC BLOOD PRESSURE: 70 MMHG | HEART RATE: 72 BPM | TEMPERATURE: 96.9 F

## 2021-09-22 DIAGNOSIS — G89.4 CHRONIC PAIN SYNDROME: ICD-10-CM

## 2021-09-22 PROCEDURE — G8417 CALC BMI ABV UP PARAM F/U: HCPCS | Performed by: NURSE PRACTITIONER

## 2021-09-22 PROCEDURE — 3017F COLORECTAL CA SCREEN DOC REV: CPT | Performed by: NURSE PRACTITIONER

## 2021-09-22 PROCEDURE — G8427 DOCREV CUR MEDS BY ELIG CLIN: HCPCS | Performed by: NURSE PRACTITIONER

## 2021-09-22 PROCEDURE — 1036F TOBACCO NON-USER: CPT | Performed by: NURSE PRACTITIONER

## 2021-09-22 PROCEDURE — 99213 OFFICE O/P EST LOW 20 MIN: CPT | Performed by: NURSE PRACTITIONER

## 2021-09-22 RX ORDER — DULOXETIN HYDROCHLORIDE 60 MG/1
60 CAPSULE, DELAYED RELEASE ORAL 2 TIMES DAILY
Qty: 60 CAPSULE | Refills: 1 | Status: SHIPPED
Start: 2021-09-22 | End: 2021-10-18 | Stop reason: SDUPTHER

## 2021-09-22 RX ORDER — CHLORZOXAZONE 500 MG/1
500 TABLET ORAL 3 TIMES DAILY PRN
Qty: 90 TABLET | Refills: 0 | Status: SHIPPED
Start: 2021-09-22 | End: 2021-10-18 | Stop reason: SDUPTHER

## 2021-09-22 RX ORDER — METHOCARBAMOL 750 MG/1
750 TABLET, FILM COATED ORAL 3 TIMES DAILY
Qty: 60 TABLET | Refills: 0 | Status: SHIPPED
Start: 2021-09-22 | End: 2021-09-22 | Stop reason: CLARIF

## 2021-09-22 NOTE — TELEPHONE ENCOUNTER
Spoke to patient. Gave results. She still needs her MRI completed so more planning when that's done.

## 2021-09-22 NOTE — PROGRESS NOTES
Do you currently have any of the following:    Fever: No  Headache:  No  Cough: No  Shortness of breath: No  Exposed to anyone with these symptoms: Sammie                                                                                                                Elissa Esquivel presents to the [facility] on 9/22/2021. Angie Hensley is complaining of pain  In her low back. The pain is constant. The pain is described as throbbing, stabbing and burning. Pain is rated on her best day at a 6, on her worst day at a 10, and on average at a 8 on the VAS scale. She took her last dose of Tylenol this monring. Angie Hensley does not have issues with constipation. Any procedures since your last visit: Yes, 8-9-21-BILATERAL LUMBAR RADIOFREQUENCY ABLATION L3, L4,L5 WITH SEDATION had no relief from the procedure, she thinks it made it worse    She is not on NSAIDS and  is not on anticoagulation medications to include none. Pacemaker or defibrillator: No.    Medication Contract and Consent for Opioid Use Documents Filed     Patient Documents       Type of Document Status Date Received Received By Description     Medication Contract Received 10/20/2020  9:17 AM FRANCY WILEY pain management pt agreement                   /70   Pulse 72   Temp 96.9 °F (36.1 °C) (Infrared)   Resp 16   Ht 5' 6\" (1.676 m)   Wt 249 lb (112.9 kg)   BMI 40.19 kg/m²      No LMP recorded. Patient has had a hysterectomy.

## 2021-09-22 NOTE — TELEPHONE ENCOUNTER
Pt completed her Xrays today. Would like to know results. She was given radiology's number to schedule her MRI. She will call to inform us when that is completed.       Electronically signed by Sonal Tee on 9/22/21 at 9:51 AM EDT
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
## 2021-10-18 ENCOUNTER — TELEPHONE (OUTPATIENT)
Dept: PAIN MANAGEMENT | Age: 51
End: 2021-10-18

## 2021-10-18 RX ORDER — CHLORZOXAZONE 500 MG/1
500 TABLET ORAL 3 TIMES DAILY PRN
Qty: 90 TABLET | Refills: 1 | Status: SHIPPED
Start: 2021-10-18 | End: 2021-12-01 | Stop reason: SDUPTHER

## 2021-10-18 RX ORDER — GABAPENTIN 600 MG/1
600 TABLET ORAL 3 TIMES DAILY
Qty: 90 TABLET | Refills: 1 | Status: SHIPPED
Start: 2021-10-18 | End: 2021-12-01 | Stop reason: SDUPTHER

## 2021-10-18 RX ORDER — DULOXETIN HYDROCHLORIDE 60 MG/1
60 CAPSULE, DELAYED RELEASE ORAL 2 TIMES DAILY
Qty: 60 CAPSULE | Refills: 1 | Status: SHIPPED
Start: 2021-10-18 | End: 2021-12-01 | Stop reason: SDUPTHER

## 2021-10-18 NOTE — TELEPHONE ENCOUNTER
Thomas Zoe is asking for a refill of her medication. Next appointment is 11-22-21  Looks like cymbalta has a refill so she will need:  Parafon forte and Gabapentin to [facility].

## 2021-11-09 NOTE — PROGRESS NOTES
Received call from MARCELLO from PT stating that patient is having increased difficulty with PT and she feels that her symptoms are getting worse. MARCELLO is referring her back to us for follow up and evaluation.

## 2021-11-10 ENCOUNTER — OFFICE VISIT (OUTPATIENT)
Dept: NEUROSURGERY | Age: 51
End: 2021-11-10
Payer: COMMERCIAL

## 2021-11-10 VITALS
BODY MASS INDEX: 39.37 KG/M2 | WEIGHT: 245 LBS | OXYGEN SATURATION: 98 % | DIASTOLIC BLOOD PRESSURE: 76 MMHG | TEMPERATURE: 98.6 F | HEART RATE: 86 BPM | HEIGHT: 66 IN | SYSTOLIC BLOOD PRESSURE: 155 MMHG

## 2021-11-10 DIAGNOSIS — M54.16 LUMBAR RADICULOPATHY: Primary | ICD-10-CM

## 2021-11-10 PROCEDURE — 99213 OFFICE O/P EST LOW 20 MIN: CPT

## 2021-11-10 ASSESSMENT — ENCOUNTER SYMPTOMS
NAUSEA: 0
TROUBLE SWALLOWING: 0
VOMITING: 0
SHORTNESS OF BREATH: 0
BACK PAIN: 0
ABDOMINAL DISTENTION: 0

## 2021-11-10 NOTE — PROGRESS NOTES
Subjective:      Patient ID: Swapna Oropeza is a 49 y.o. female. Pt seen in neurosurgery clinic for evaluation and treatment of back pain. She states that she has done 4 sessions of PT and they're not helping her. She's having increased pain and worsening symptoms. She states that she feels like she's stumbling more. She also states that she has had some bladder and bowel incontinence and she states that she can't always feel when she needs to go to the bathroom. Review of Systems   Constitutional: Negative for fever. HENT: Negative for trouble swallowing. Eyes: Negative for visual disturbance. Respiratory: Negative for shortness of breath. Cardiovascular: Negative for chest pain. Gastrointestinal: Negative for abdominal distention, nausea and vomiting. Endocrine: Negative for polyuria. Genitourinary: Negative for dysuria. Musculoskeletal: Negative for back pain and neck pain. Skin: Negative for rash. Neurological: Negative for facial asymmetry and numbness. Psychiatric/Behavioral: Negative for confusion. Objective:   Physical Exam  Constitutional:       Appearance: Normal appearance. HENT:      Head: Normocephalic and atraumatic. Eyes:      Extraocular Movements: Extraocular movements intact. Conjunctiva/sclera: Conjunctivae normal.      Pupils: Pupils are equal, round, and reactive to light. Cardiovascular:      Rate and Rhythm: Normal rate. Pulmonary:      Effort: Pulmonary effort is normal.      Breath sounds: Normal breath sounds. Abdominal:      General: Abdomen is flat. There is no distension. Palpations: Abdomen is soft. Musculoskeletal:         General: Normal range of motion. Cervical back: Normal range of motion and neck supple. Skin:     General: Skin is warm and dry. Neurological:      Mental Status: She is alert.       Comments: Alert and oriented x3  CN 3-12 intact  Motor strength full  Sensation intact to LT  Reflexes normal

## 2021-11-30 NOTE — PROGRESS NOTES
[facility]  [facility] [facility], [address] [place] [address] [place]  [phone]  Telephone follow up visit      Date of Visit:  12/1/2021    CC:       HPI:  Patient here with worsening low back pain with intermittent bilateral leg radicular sx to mid thigh accompanied by numbness and tingling in bilateral legs. Pt reports approximately one week ago started having numbness and tingling in the groin with bladder leakage. Pt counseled that is a red flag and needs to go to ER. Pt currently doing physical therapy x 4 sessions and pt reports making pain worse. Pt had recent visit with Neurosurgeon at MRI of lumbar spine ordered. Appropriate analgesia with current medications regimen: no  Change in quality of symptoms: yes RED flag sx   Medication side effects: none  Recent diagnostic testing: none  Recent interventional procedures:none     She has not been on anticoagulation medications to include none. The patient Elizabeth Ellington not been on herbal supplements.  The patient is not diabetic.     Imaging:      Thoracic Xray 09/16/2020  There are    multilevel degenerative changes      Impression    No acute abnormality of the thoracic spine            Lumbar spine MRI 02/2020  Degenerative changes of the lumbar spine as discussed level by level in the body of the report.  No significant change since prior examination.  Findings are most pronounced at L4-5 with mild to moderate left and mild right neural foraminal stenosis, unchanged.  Right far lateral disc extrusion at L2-3 abutting exiting nerve roots, unchanged.  No significant spinal canal narrowing. Right lower extremity EMG:  This study was Abnormal.    1. There is electrodiagnostic evidence of right lumbar motor radiculopathy, as indicated by abnormal right lumbar paraspinal testing. This is not further defined, however, as all tested right lower extremity muscles are normal.      2.  There is no electrodiagnostic evidence of any other peripheral nerve mononeuropathy, plexopathy, or peripheral polyneuropathy in the bilateral lower extremities. Cannot evaluate for left lumbar radiculopathy without completion of needle exam of the left lower extremity. Cannot evaluate for pure sensory radiculopathy or small fiber neuropathy by electrodiagnostic technique.      Previous treatments: medications. , ztlido itching, dizziness, lethargy with tizanidine, baclofen no relief                                        Potential Aberrant Drug-Related Behavior:   None      Urine Drug Screening:  None      OARRS report:  12/2021 consistent    Past Medical History: Reviewed    Past Surgical History: Reviewed     Home Medications: Reviewed    Allergies: Reviewed     Social History: Reviewed     REVIEW OF SYSTEMS:     Davon Tom denies fever/chills, chest pain, shortness of breath, new bowel or bladder complaints. All other review of systems was negative. PHYSICAL EXAMINATION:    General:       General appearance: tearful, pleasant and well-hydrated. , in moderate to severe discomfort and A & O x3  Build: Morbidly obese     HEENT:     Head:normocephalic and atraumatic  Sclera: icterus present,      Lungs:     Breathing:Breathing Pattern: regular, no distress     Abdomen:     Shape:non-distended and normal  Tenderness:none     Thoracic spine:     Spine inspection:normal   pationPal:tenderness paravertebral muscles, midline tenderness, facet loading, left, right and negative. Range of motion:abnormal moderately flexion, extension rotation bilateral and is mildly painful.     Lumbar spine:     Spine inspection:normal, exaggerated kyphosis, scoliosis, lordosis and surgical incision scar   CVA tenderness:No   Palpationright facet tenderness   Range of motion:abnormal moderately Lateral bending, flexion, extension rotation right and is severely painful.   Facet loading positive right and left     Musculoskeletal:     Trigger points in Paraveteral:present right side  SI joint tenderness:negative right, negative left. SLR:positive right,positive left, sitting      Extremities:     Tremors:None bilaterally upper and lower  Range of motion, pain with internal rotation of hips negative. Intact:Yes  Edema: bilateral LE L>R 2+ pitting edema       Neurological:     Sensory:diminished to light touch lateral aspect of right leg and right calf  Motor:                          Right Quadriceps3/5          Left Quadriceps5/5           Right Gastrocnemius3/5    Left Gastrocnemius5/5  Right Ant Tibialis3/5  Left Ant Tibialis5/5     Gait:normal     Dermatology:     Skin:no unusual rashes and no skin lesions         Impression:  Chronic low back pain with radiation to the right lower extremity with numbness in the right leg. Lumbar spine MRI 2020 L2-3 right lateral disc extrusion and L4-5 disc bulge with facet hypertrophy and foraminal stenosis. \  Tried and failed TFESI      Plan:  Followed for lbp primarily axial worsening with intermittent bilateral lateral leg radicular sx with NEW n/t to groin with bladder incontinence and pt advised to go to ER now. Refused ambulance and states her  can take her  8/9/21: Bilateral LRFA L345 with no relief  Followed by Neurosurgeon and MRI ordered   Completed physical therapy x 3-4 sessions making pain worse  Refill Cymbalta 60mg po bid   Refill Gabapentin 400mg po tid   Refill Parafon forte 500 po tid prn spasms  NSAIDS contraindicated due to allergy to ASA  Continue with OTC pain medications as tolerated  OARRS report reviewed   Patient encouraged to stay active and to lose weight. Treatment plan discussed with the patient including medications side effects.     We discussed with the patient that combining opioids, benzodiazepines, alcohol, illicit drugs or sleep aids increases the risk of respiratory depression including death.  We discussed that these medications may cause drowsiness, sedation or dizziness and have counseled the patient not to drive or operate machinery. We have discussed that these medications will be prescribed only by one provider. We have discussed with the patient about age related risk factors and have thoroughly discussed the importance of taking these medications as prescribed.  The patient verbalizes understanding.

## 2021-12-01 ENCOUNTER — OFFICE VISIT (OUTPATIENT)
Dept: PAIN MANAGEMENT | Age: 51
End: 2021-12-01
Payer: COMMERCIAL

## 2021-12-01 VITALS
HEIGHT: 66 IN | HEART RATE: 85 BPM | WEIGHT: 245 LBS | BODY MASS INDEX: 39.37 KG/M2 | SYSTOLIC BLOOD PRESSURE: 160 MMHG | DIASTOLIC BLOOD PRESSURE: 80 MMHG | TEMPERATURE: 96.4 F | RESPIRATION RATE: 20 BRPM | OXYGEN SATURATION: 94 %

## 2021-12-01 DIAGNOSIS — M43.06 LUMBAR SPONDYLOLYSIS: ICD-10-CM

## 2021-12-01 DIAGNOSIS — M54.16 LUMBAR RADICULOPATHY: ICD-10-CM

## 2021-12-01 DIAGNOSIS — M51.9 LUMBAR DISC DISORDER: ICD-10-CM

## 2021-12-01 DIAGNOSIS — M54.6 PAIN IN THORACIC SPINE: ICD-10-CM

## 2021-12-01 DIAGNOSIS — M47.816 LUMBAR FACET ARTHROPATHY: ICD-10-CM

## 2021-12-01 DIAGNOSIS — G89.4 CHRONIC PAIN SYNDROME: Primary | ICD-10-CM

## 2021-12-01 PROCEDURE — G8417 CALC BMI ABV UP PARAM F/U: HCPCS | Performed by: NURSE PRACTITIONER

## 2021-12-01 PROCEDURE — 1036F TOBACCO NON-USER: CPT | Performed by: NURSE PRACTITIONER

## 2021-12-01 PROCEDURE — 3017F COLORECTAL CA SCREEN DOC REV: CPT | Performed by: NURSE PRACTITIONER

## 2021-12-01 PROCEDURE — 99213 OFFICE O/P EST LOW 20 MIN: CPT | Performed by: NURSE PRACTITIONER

## 2021-12-01 PROCEDURE — G8484 FLU IMMUNIZE NO ADMIN: HCPCS | Performed by: NURSE PRACTITIONER

## 2021-12-01 PROCEDURE — G8427 DOCREV CUR MEDS BY ELIG CLIN: HCPCS | Performed by: NURSE PRACTITIONER

## 2021-12-01 RX ORDER — CHLORZOXAZONE 500 MG/1
500 TABLET ORAL 3 TIMES DAILY PRN
Qty: 90 TABLET | Refills: 1 | Status: SHIPPED | OUTPATIENT
Start: 2021-12-01 | End: 2021-12-31

## 2021-12-01 RX ORDER — GABAPENTIN 600 MG/1
600 TABLET ORAL 3 TIMES DAILY
Qty: 90 TABLET | Refills: 1 | Status: SHIPPED | OUTPATIENT
Start: 2021-12-01 | End: 2021-12-31

## 2021-12-01 RX ORDER — DULOXETIN HYDROCHLORIDE 60 MG/1
60 CAPSULE, DELAYED RELEASE ORAL 2 TIMES DAILY
Qty: 60 CAPSULE | Refills: 1 | Status: SHIPPED | OUTPATIENT
Start: 2021-12-01 | End: 2021-12-31

## 2021-12-01 NOTE — PROGRESS NOTES
Do you currently have any of the following:    Fever: No  Headache:  No  Cough: No  Shortness of breath: No  Exposed to anyone with these symptoms: No                                                                                                                Quentin Carrion presents to the [address] on 12/1/2021. Fabby De Leon is complaining of pain in her mid to lower back which radiates down bilateral legs to toes and the right is greater. The pain is constant. The pain is described as stabbing and burning. Pain is rated on her best day at a 3, on her worst day at a 10, and on average at a 5 on the VAS scale. She took her last dose of Tylenol this am and gabapentin this am. Fabby De Leon does not have issues with constipation. Any procedures since your last visit: No,     She is  on NSAIDS and  is not on anticoagulation medications to include none     Pacemaker or defibrillator: No     Medication Contract and Consent for Opioid Use Documents Filed     Patient Documents     Type of Document Status Date Received Received By Description    Medication Contract Received 10/20/2020  9:17 AM FRANCY WILEY pain management pt agreement                   BP (!) 160/80   Pulse 85   Temp 96.4 °F (35.8 °C)   Resp 20   Ht 5' 6\" (1.676 m)   Wt 245 lb (111.1 kg)   SpO2 94%   BMI 39.54 kg/m²      No LMP recorded. Patient has had a hysterectomy.

## (undated) DEVICE — CVD CANNULA

## (undated) DEVICE — GAUZE,SPONGE,4"X4",12PLY,STERILE,LF,2'S: Brand: MEDLINE

## (undated) DEVICE — 12 ML SYRINGE,LUER-LOCK TIP: Brand: MONOJECT

## (undated) DEVICE — NEEDLE SPNL 22GA L5IN BLK HUB S STL W/ QNCKE PNT W/OUT

## (undated) DEVICE — 3 ML SYRINGE LUER-LOCK TIP: Brand: MONOJECT

## (undated) DEVICE — GLOVE ORANGE PI 7 1/2   MSG9075

## (undated) DEVICE — NEEDLE HYPO 18GA L1.5IN PNK POLYPR HUB S STL THN WALL FILL

## (undated) DEVICE — Z DISCONTINUED APPLICATOR SURG PREP 0.35OZ 2% CHG 70% ISO ALC W/ HI LT

## (undated) DEVICE — BANDAGE ADH W1XL3IN NAT FAB WVN FLX DURABLE N ADH PD SEAL

## (undated) DEVICE — ELECTRODE SURG MPLR NEUT SELF ADH PT PLT MULTIGEN

## (undated) DEVICE — 6 ML SYRINGE LUER-LOCK TIP: Brand: MONOJECT

## (undated) DEVICE — 3M™ RED DOT™ MONITORING ELECTRODE WITH FOAM TAPE AND STICKY GEL 2560, 50/BAG, 20/CASE, 72/PLT: Brand: RED DOT™

## (undated) DEVICE — NEEDLE HYPO 25GA L1.5IN BLU POLYPR HUB S STL REG BVL STR

## (undated) DEVICE — NEEDLE HYPO 27GA L1.25IN GRY POLYPR HUB S STL REG BVL STR

## (undated) DEVICE — 22GX5" WHITACRE SPINAL NEEDLE: Brand: MEDLINE

## (undated) DEVICE — NDL, WHITACRE SPINAL, 22GX3.5": Brand: MEDLINE